# Patient Record
Sex: FEMALE | Race: WHITE | NOT HISPANIC OR LATINO | URBAN - METROPOLITAN AREA
[De-identification: names, ages, dates, MRNs, and addresses within clinical notes are randomized per-mention and may not be internally consistent; named-entity substitution may affect disease eponyms.]

---

## 2017-06-18 ENCOUNTER — EMERGENCY (EMERGENCY)
Facility: HOSPITAL | Age: 57
LOS: 0 days | Discharge: HOME | End: 2017-06-18
Admitting: INTERNAL MEDICINE

## 2017-06-18 DIAGNOSIS — N20.0 CALCULUS OF KIDNEY: ICD-10-CM

## 2017-06-18 DIAGNOSIS — E78.5 HYPERLIPIDEMIA, UNSPECIFIED: ICD-10-CM

## 2017-06-18 DIAGNOSIS — M54.9 DORSALGIA, UNSPECIFIED: ICD-10-CM

## 2017-06-28 DIAGNOSIS — R20.2 PARESTHESIA OF SKIN: ICD-10-CM

## 2017-06-28 DIAGNOSIS — I10 ESSENTIAL (PRIMARY) HYPERTENSION: ICD-10-CM
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
## 2017-06-28 DIAGNOSIS — R00.2 PALPITATIONS: ICD-10-CM

## 2017-06-28 DIAGNOSIS — F10.129 ALCOHOL ABUSE WITH INTOXICATION, UNSPECIFIED: ICD-10-CM

## 2017-06-28 DIAGNOSIS — E78.00 PURE HYPERCHOLESTEROLEMIA, UNSPECIFIED: ICD-10-CM

## 2017-06-28 DIAGNOSIS — Z79.899 OTHER LONG TERM (CURRENT) DRUG THERAPY: ICD-10-CM

## 2018-03-28 ENCOUNTER — OUTPATIENT (OUTPATIENT)
Dept: OUTPATIENT SERVICES | Facility: HOSPITAL | Age: 58
LOS: 1 days | Discharge: HOME | End: 2018-03-28

## 2018-03-28 DIAGNOSIS — Z12.31 ENCOUNTER FOR SCREENING MAMMOGRAM FOR MALIGNANT NEOPLASM OF BREAST: ICD-10-CM

## 2018-10-18 ENCOUNTER — OUTPATIENT (OUTPATIENT)
Dept: OUTPATIENT SERVICES | Facility: HOSPITAL | Age: 58
LOS: 1 days | Discharge: HOME | End: 2018-10-18

## 2018-10-22 DIAGNOSIS — M89.9 DISORDER OF BONE, UNSPECIFIED: ICD-10-CM

## 2018-10-22 DIAGNOSIS — Z78.0 ASYMPTOMATIC MENOPAUSAL STATE: ICD-10-CM

## 2018-10-22 DIAGNOSIS — Z13.820 ENCOUNTER FOR SCREENING FOR OSTEOPOROSIS: ICD-10-CM

## 2019-04-23 ENCOUNTER — OUTPATIENT (OUTPATIENT)
Dept: OUTPATIENT SERVICES | Facility: HOSPITAL | Age: 59
LOS: 1 days | Discharge: HOME | End: 2019-04-23
Payer: MEDICAID

## 2019-04-23 DIAGNOSIS — Z12.31 ENCOUNTER FOR SCREENING MAMMOGRAM FOR MALIGNANT NEOPLASM OF BREAST: ICD-10-CM

## 2019-04-23 PROCEDURE — 77067 SCR MAMMO BI INCL CAD: CPT | Mod: 26

## 2019-04-23 PROCEDURE — 77063 BREAST TOMOSYNTHESIS BI: CPT | Mod: 26

## 2020-08-12 ENCOUNTER — OUTPATIENT (OUTPATIENT)
Dept: OUTPATIENT SERVICES | Facility: HOSPITAL | Age: 60
LOS: 1 days | Discharge: HOME | End: 2020-08-12
Payer: MEDICAID

## 2020-08-12 DIAGNOSIS — Z12.31 ENCOUNTER FOR SCREENING MAMMOGRAM FOR MALIGNANT NEOPLASM OF BREAST: ICD-10-CM

## 2020-08-12 PROCEDURE — 77067 SCR MAMMO BI INCL CAD: CPT | Mod: 26

## 2020-08-12 PROCEDURE — 77063 BREAST TOMOSYNTHESIS BI: CPT | Mod: 26

## 2020-10-05 ENCOUNTER — TRANSCRIPTION ENCOUNTER (OUTPATIENT)
Age: 60
End: 2020-10-05

## 2021-06-14 ENCOUNTER — TRANSCRIPTION ENCOUNTER (OUTPATIENT)
Age: 61
End: 2021-06-14

## 2022-01-25 ENCOUNTER — TRANSCRIPTION ENCOUNTER (OUTPATIENT)
Age: 62
End: 2022-01-25

## 2022-05-13 ENCOUNTER — OUTPATIENT (OUTPATIENT)
Dept: OUTPATIENT SERVICES | Facility: HOSPITAL | Age: 62
LOS: 1 days | Discharge: HOME | End: 2022-05-13
Payer: MEDICAID

## 2022-05-13 DIAGNOSIS — Z12.31 ENCOUNTER FOR SCREENING MAMMOGRAM FOR MALIGNANT NEOPLASM OF BREAST: ICD-10-CM

## 2022-05-13 PROCEDURE — 77067 SCR MAMMO BI INCL CAD: CPT | Mod: 26

## 2022-05-13 PROCEDURE — 77063 BREAST TOMOSYNTHESIS BI: CPT | Mod: 26

## 2022-05-16 DIAGNOSIS — Z78.0 ASYMPTOMATIC MENOPAUSAL STATE: ICD-10-CM

## 2022-05-16 DIAGNOSIS — M89.9 DISORDER OF BONE, UNSPECIFIED: ICD-10-CM

## 2022-05-16 DIAGNOSIS — Z13.820 ENCOUNTER FOR SCREENING FOR OSTEOPOROSIS: ICD-10-CM

## 2023-01-09 ENCOUNTER — APPOINTMENT (OUTPATIENT)
Dept: CARDIOLOGY | Facility: CLINIC | Age: 63
End: 2023-01-09
Payer: MEDICAID

## 2023-01-09 ENCOUNTER — RESULT CHARGE (OUTPATIENT)
Age: 63
End: 2023-01-09

## 2023-01-09 VITALS
BODY MASS INDEX: 33.86 KG/M2 | RESPIRATION RATE: 14 BRPM | TEMPERATURE: 97.9 F | DIASTOLIC BLOOD PRESSURE: 72 MMHG | SYSTOLIC BLOOD PRESSURE: 110 MMHG | WEIGHT: 184 LBS | HEIGHT: 62 IN | HEART RATE: 71 BPM

## 2023-01-09 DIAGNOSIS — Z78.9 OTHER SPECIFIED HEALTH STATUS: ICD-10-CM

## 2023-01-09 DIAGNOSIS — I34.1 NONRHEUMATIC MITRAL (VALVE) PROLAPSE: ICD-10-CM

## 2023-01-09 DIAGNOSIS — Z82.49 FAMILY HISTORY OF ISCHEMIC HEART DISEASE AND OTHER DISEASES OF THE CIRCULATORY SYSTEM: ICD-10-CM

## 2023-01-09 PROCEDURE — 93242 EXT ECG>48HR<7D RECORDING: CPT

## 2023-01-09 PROCEDURE — 99204 OFFICE O/P NEW MOD 45 MIN: CPT | Mod: 25

## 2023-01-09 PROCEDURE — 93000 ELECTROCARDIOGRAM COMPLETE: CPT | Mod: 59

## 2023-01-09 RX ORDER — AMITRIPTYLINE HYDROCHLORIDE 10 MG/1
10 TABLET, FILM COATED ORAL
Refills: 0 | Status: ACTIVE | COMMUNITY

## 2023-01-09 RX ORDER — BLOOD-GLUCOSE METER
W/DEVICE KIT MISCELLANEOUS
Qty: 1 | Refills: 0 | Status: ACTIVE | COMMUNITY
Start: 2022-09-29

## 2023-01-09 RX ORDER — LEVOTHYROXINE SODIUM 50 UG/1
50 TABLET ORAL
Qty: 30 | Refills: 0 | Status: ACTIVE | COMMUNITY
Start: 2022-08-29

## 2023-01-09 RX ORDER — SENNOSIDES 8.6 MG TABLETS 8.6 MG/1
8.6 TABLET ORAL
Qty: 120 | Refills: 0 | Status: ACTIVE | COMMUNITY
Start: 2023-01-03

## 2023-01-09 RX ORDER — ZOLPIDEM TARTRATE 10 MG/1
10 TABLET ORAL
Refills: 0 | Status: ACTIVE | COMMUNITY

## 2023-01-09 RX ORDER — IRBESARTAN AND HYDROCHLOROTHIAZIDE 300; 12.5 MG/1; MG/1
300-12.5 TABLET ORAL DAILY
Refills: 0 | Status: ACTIVE | COMMUNITY

## 2023-01-09 RX ORDER — LANCETS 28 GAUGE
EACH MISCELLANEOUS
Qty: 100 | Refills: 0 | Status: ACTIVE | COMMUNITY
Start: 2022-09-29

## 2023-01-09 RX ORDER — FLUOXETINE HYDROCHLORIDE 20 MG/1
20 TABLET ORAL DAILY
Refills: 0 | Status: ACTIVE | COMMUNITY

## 2023-01-09 RX ORDER — FREMANEZUMAB-VFRM 225 MG/1.5ML
225 INJECTION SUBCUTANEOUS
Qty: 2 | Refills: 0 | Status: ACTIVE | COMMUNITY
Start: 2022-09-07

## 2023-01-09 RX ORDER — BLOOD SUGAR DIAGNOSTIC
STRIP MISCELLANEOUS
Qty: 50 | Refills: 0 | Status: ACTIVE | COMMUNITY
Start: 2022-09-29

## 2023-01-09 RX ORDER — FLUOXETINE HYDROCHLORIDE 10 MG/1
10 TABLET ORAL DAILY
Refills: 0 | Status: ACTIVE | COMMUNITY

## 2023-01-09 RX ORDER — ISOPROPYL ALCOHOL 70 ML/100ML
70 SWAB TOPICAL
Qty: 100 | Refills: 0 | Status: ACTIVE | COMMUNITY
Start: 2022-09-29

## 2023-01-09 RX ORDER — ATORVASTATIN CALCIUM 20 MG/1
20 TABLET, FILM COATED ORAL DAILY
Refills: 0 | Status: ACTIVE | COMMUNITY

## 2023-01-09 RX ORDER — LACTULOSE 10 G/10G
10 POWDER, FOR SOLUTION ORAL
Qty: 60 | Refills: 0 | Status: ACTIVE | COMMUNITY
Start: 2022-12-20

## 2023-01-09 RX ORDER — METOPROLOL SUCCINATE 50 MG/1
50 TABLET, EXTENDED RELEASE ORAL
Qty: 30 | Refills: 0 | Status: ACTIVE | COMMUNITY
Start: 2022-08-29

## 2023-01-09 RX ORDER — PRUCALOPRIDE 2 MG/1
2 TABLET, FILM COATED ORAL
Qty: 30 | Refills: 0 | Status: ACTIVE | COMMUNITY
Start: 2022-10-17

## 2023-01-09 RX ORDER — LUBIPROSTONE 24 UG/1
24 CAPSULE ORAL
Qty: 60 | Refills: 0 | Status: ACTIVE | COMMUNITY
Start: 2022-08-25

## 2023-01-09 NOTE — HISTORY OF PRESENT ILLNESS
[FreeTextEntry1] : 62 y.o. female with PMH of FH of CAD, DM type II, HTN, hyperlipidemia, hypothyroidism, mitral valve prolapse presents to establish care. Patient states that she had a Hx of arrhythmia and has been on Metoprolol for the last 20 years. PCP is Dr. Wilkerson. Had multiple syncope episodes in the past. Presyncope with bearing down. Denies chest pain. Reports frequent daily nervousness, fluttering inside the chest associated with difficulty breathing.

## 2023-01-09 NOTE — REVIEW OF SYSTEMS
[Blurry Vision] : no blurred vision [SOB] : shortness of breath [Chest Discomfort] : no chest discomfort [Lower Ext Edema] : no extremity edema [Leg Claudication] : no intermittent leg claudication [Palpitations] : palpitations [Syncope] : syncope [Anxiety] : no anxiety [Easy Bruising] : no tendency for easy bruising [Negative] : Neurological

## 2023-01-16 ENCOUNTER — EMERGENCY (EMERGENCY)
Facility: HOSPITAL | Age: 63
LOS: 0 days | Discharge: HOME | End: 2023-01-17
Attending: STUDENT IN AN ORGANIZED HEALTH CARE EDUCATION/TRAINING PROGRAM | Admitting: EMERGENCY MEDICINE
Payer: MEDICAID

## 2023-01-16 VITALS
RESPIRATION RATE: 14 BRPM | OXYGEN SATURATION: 98 % | HEART RATE: 81 BPM | TEMPERATURE: 99 F | DIASTOLIC BLOOD PRESSURE: 68 MMHG | SYSTOLIC BLOOD PRESSURE: 158 MMHG

## 2023-01-16 DIAGNOSIS — E11.9 TYPE 2 DIABETES MELLITUS WITHOUT COMPLICATIONS: ICD-10-CM

## 2023-01-16 DIAGNOSIS — I10 ESSENTIAL (PRIMARY) HYPERTENSION: ICD-10-CM

## 2023-01-16 DIAGNOSIS — R06.02 SHORTNESS OF BREATH: ICD-10-CM

## 2023-01-16 DIAGNOSIS — Z20.822 CONTACT WITH AND (SUSPECTED) EXPOSURE TO COVID-19: ICD-10-CM

## 2023-01-16 DIAGNOSIS — E03.9 HYPOTHYROIDISM, UNSPECIFIED: ICD-10-CM

## 2023-01-16 DIAGNOSIS — R07.89 OTHER CHEST PAIN: ICD-10-CM

## 2023-01-16 DIAGNOSIS — R07.9 CHEST PAIN, UNSPECIFIED: ICD-10-CM

## 2023-01-16 DIAGNOSIS — E78.00 PURE HYPERCHOLESTEROLEMIA, UNSPECIFIED: ICD-10-CM

## 2023-01-16 DIAGNOSIS — F32.A DEPRESSION, UNSPECIFIED: ICD-10-CM

## 2023-01-16 DIAGNOSIS — R00.2 PALPITATIONS: ICD-10-CM

## 2023-01-16 DIAGNOSIS — I34.1 NONRHEUMATIC MITRAL (VALVE) PROLAPSE: ICD-10-CM

## 2023-01-16 LAB
ALBUMIN SERPL ELPH-MCNC: 4.9 G/DL — SIGNIFICANT CHANGE UP (ref 3.5–5.2)
ALP SERPL-CCNC: 54 U/L — SIGNIFICANT CHANGE UP (ref 30–115)
ALT FLD-CCNC: 36 U/L — SIGNIFICANT CHANGE UP (ref 0–41)
ANION GAP SERPL CALC-SCNC: 13 MMOL/L — SIGNIFICANT CHANGE UP (ref 7–14)
APTT BLD: 28.4 SEC — SIGNIFICANT CHANGE UP (ref 27–39.2)
AST SERPL-CCNC: 27 U/L — SIGNIFICANT CHANGE UP (ref 0–41)
BASE EXCESS BLDV CALC-SCNC: 1.2 MMOL/L — SIGNIFICANT CHANGE UP (ref -2–3)
BASE EXCESS BLDV CALC-SCNC: 4.6 MMOL/L — HIGH (ref -2–3)
BASOPHILS # BLD AUTO: 0.03 K/UL — SIGNIFICANT CHANGE UP (ref 0–0.2)
BASOPHILS NFR BLD AUTO: 0.4 % — SIGNIFICANT CHANGE UP (ref 0–1)
BILIRUB SERPL-MCNC: 0.6 MG/DL — SIGNIFICANT CHANGE UP (ref 0.2–1.2)
BUN SERPL-MCNC: 11 MG/DL — SIGNIFICANT CHANGE UP (ref 10–20)
CA-I SERPL-SCNC: 1.23 MMOL/L — SIGNIFICANT CHANGE UP (ref 1.15–1.33)
CA-I SERPL-SCNC: 1.25 MMOL/L — SIGNIFICANT CHANGE UP (ref 1.15–1.33)
CALCIUM SERPL-MCNC: 10.4 MG/DL — SIGNIFICANT CHANGE UP (ref 8.4–10.4)
CHLORIDE SERPL-SCNC: 100 MMOL/L — SIGNIFICANT CHANGE UP (ref 98–110)
CO2 SERPL-SCNC: 23 MMOL/L — SIGNIFICANT CHANGE UP (ref 17–32)
CREAT SERPL-MCNC: 0.8 MG/DL — SIGNIFICANT CHANGE UP (ref 0.7–1.5)
D DIMER BLD IA.RAPID-MCNC: <150 NG/ML DDU — SIGNIFICANT CHANGE UP
EGFR: 83 ML/MIN/1.73M2 — SIGNIFICANT CHANGE UP
EOSINOPHIL # BLD AUTO: 0.02 K/UL — SIGNIFICANT CHANGE UP (ref 0–0.7)
EOSINOPHIL NFR BLD AUTO: 0.3 % — SIGNIFICANT CHANGE UP (ref 0–8)
FLUAV AG NPH QL: SIGNIFICANT CHANGE UP
FLUBV AG NPH QL: SIGNIFICANT CHANGE UP
GAS PNL BLDV: 134 MMOL/L — LOW (ref 136–145)
GAS PNL BLDV: 135 MMOL/L — LOW (ref 136–145)
GAS PNL BLDV: SIGNIFICANT CHANGE UP
GAS PNL BLDV: SIGNIFICANT CHANGE UP
GLUCOSE SERPL-MCNC: 170 MG/DL — HIGH (ref 70–99)
HCO3 BLDV-SCNC: 22 MMOL/L — SIGNIFICANT CHANGE UP (ref 22–29)
HCO3 BLDV-SCNC: 26 MMOL/L — SIGNIFICANT CHANGE UP (ref 22–29)
HCT VFR BLD CALC: 43 % — SIGNIFICANT CHANGE UP (ref 37–47)
HCT VFR BLDA CALC: 44 % — SIGNIFICANT CHANGE UP (ref 39–51)
HCT VFR BLDA CALC: 45 % — SIGNIFICANT CHANGE UP (ref 39–51)
HGB BLD CALC-MCNC: 14.6 G/DL — SIGNIFICANT CHANGE UP (ref 12.6–17.4)
HGB BLD CALC-MCNC: 15 G/DL — SIGNIFICANT CHANGE UP (ref 12.6–17.4)
HGB BLD-MCNC: 14.9 G/DL — SIGNIFICANT CHANGE UP (ref 12–16)
IMM GRANULOCYTES NFR BLD AUTO: 0.3 % — SIGNIFICANT CHANGE UP (ref 0.1–0.3)
INR BLD: 0.93 RATIO — SIGNIFICANT CHANGE UP (ref 0.65–1.3)
LACTATE BLDV-MCNC: 3.1 MMOL/L — HIGH (ref 0.5–2)
LACTATE BLDV-MCNC: 3.5 MMOL/L — HIGH (ref 0.5–2)
LIDOCAIN IGE QN: 45 U/L — SIGNIFICANT CHANGE UP (ref 7–60)
LYMPHOCYTES # BLD AUTO: 2.56 K/UL — SIGNIFICANT CHANGE UP (ref 1.2–3.4)
LYMPHOCYTES # BLD AUTO: 36.7 % — SIGNIFICANT CHANGE UP (ref 20.5–51.1)
MAGNESIUM SERPL-MCNC: 1.7 MG/DL — LOW (ref 1.8–2.4)
MCHC RBC-ENTMCNC: 29.7 PG — SIGNIFICANT CHANGE UP (ref 27–31)
MCHC RBC-ENTMCNC: 34.7 G/DL — SIGNIFICANT CHANGE UP (ref 32–37)
MCV RBC AUTO: 85.7 FL — SIGNIFICANT CHANGE UP (ref 81–99)
MONOCYTES # BLD AUTO: 0.55 K/UL — SIGNIFICANT CHANGE UP (ref 0.1–0.6)
MONOCYTES NFR BLD AUTO: 7.9 % — SIGNIFICANT CHANGE UP (ref 1.7–9.3)
NEUTROPHILS # BLD AUTO: 3.8 K/UL — SIGNIFICANT CHANGE UP (ref 1.4–6.5)
NEUTROPHILS NFR BLD AUTO: 54.4 % — SIGNIFICANT CHANGE UP (ref 42.2–75.2)
NRBC # BLD: 0 /100 WBCS — SIGNIFICANT CHANGE UP (ref 0–0)
NT-PROBNP SERPL-SCNC: 43 PG/ML — SIGNIFICANT CHANGE UP (ref 0–300)
PCO2 BLDV: 26 MMHG — LOW (ref 39–42)
PCO2 BLDV: 28 MMHG — LOW (ref 39–42)
PH BLDV: 7.54 — HIGH (ref 7.32–7.43)
PH BLDV: 7.57 — HIGH (ref 7.32–7.43)
PLATELET # BLD AUTO: 311 K/UL — SIGNIFICANT CHANGE UP (ref 130–400)
PO2 BLDV: 30 MMHG — SIGNIFICANT CHANGE UP
PO2 BLDV: 48 MMHG — SIGNIFICANT CHANGE UP
POTASSIUM BLDV-SCNC: 2.8 MMOL/L — CRITICAL LOW (ref 3.5–5.1)
POTASSIUM BLDV-SCNC: 3 MMOL/L — LOW (ref 3.5–5.1)
POTASSIUM SERPL-MCNC: 3.3 MMOL/L — LOW (ref 3.5–5)
POTASSIUM SERPL-SCNC: 3.3 MMOL/L — LOW (ref 3.5–5)
PROT SERPL-MCNC: 6.9 G/DL — SIGNIFICANT CHANGE UP (ref 6–8)
PROTHROM AB SERPL-ACNC: 10.6 SEC — SIGNIFICANT CHANGE UP (ref 9.95–12.87)
RBC # BLD: 5.02 M/UL — SIGNIFICANT CHANGE UP (ref 4.2–5.4)
RBC # FLD: 13.2 % — SIGNIFICANT CHANGE UP (ref 11.5–14.5)
RSV RNA NPH QL NAA+NON-PROBE: SIGNIFICANT CHANGE UP
SAO2 % BLDV: 51.6 % — SIGNIFICANT CHANGE UP
SAO2 % BLDV: 81.2 % — SIGNIFICANT CHANGE UP
SARS-COV-2 RNA SPEC QL NAA+PROBE: SIGNIFICANT CHANGE UP
SODIUM SERPL-SCNC: 136 MMOL/L — SIGNIFICANT CHANGE UP (ref 135–146)
TROPONIN T SERPL-MCNC: <0.01 NG/ML — SIGNIFICANT CHANGE UP
WBC # BLD: 6.98 K/UL — SIGNIFICANT CHANGE UP (ref 4.8–10.8)
WBC # FLD AUTO: 6.98 K/UL — SIGNIFICANT CHANGE UP (ref 4.8–10.8)

## 2023-01-16 PROCEDURE — 93010 ELECTROCARDIOGRAM REPORT: CPT

## 2023-01-16 PROCEDURE — 93010 ELECTROCARDIOGRAM REPORT: CPT | Mod: 77

## 2023-01-16 PROCEDURE — 36000 PLACE NEEDLE IN VEIN: CPT

## 2023-01-16 PROCEDURE — 71045 X-RAY EXAM CHEST 1 VIEW: CPT | Mod: 26

## 2023-01-16 PROCEDURE — 76937 US GUIDE VASCULAR ACCESS: CPT | Mod: 26

## 2023-01-16 PROCEDURE — 99236 HOSP IP/OBS SAME DATE HI 85: CPT | Mod: 25

## 2023-01-16 RX ORDER — MAGNESIUM SULFATE 500 MG/ML
2 VIAL (ML) INJECTION ONCE
Refills: 0 | Status: COMPLETED | OUTPATIENT
Start: 2023-01-16 | End: 2023-01-16

## 2023-01-16 RX ORDER — SODIUM CHLORIDE 9 MG/ML
1000 INJECTION, SOLUTION INTRAVENOUS ONCE
Refills: 0 | Status: COMPLETED | OUTPATIENT
Start: 2023-01-16 | End: 2023-01-16

## 2023-01-16 RX ORDER — DIAZEPAM 5 MG
5 TABLET ORAL ONCE
Refills: 0 | Status: DISCONTINUED | OUTPATIENT
Start: 2023-01-16 | End: 2023-01-16

## 2023-01-16 RX ORDER — POTASSIUM CHLORIDE 20 MEQ
40 PACKET (EA) ORAL ONCE
Refills: 0 | Status: COMPLETED | OUTPATIENT
Start: 2023-01-16 | End: 2023-01-16

## 2023-01-16 RX ADMIN — Medication 40 MILLIEQUIVALENT(S): at 22:41

## 2023-01-16 RX ADMIN — SODIUM CHLORIDE 1000 MILLILITER(S): 9 INJECTION, SOLUTION INTRAVENOUS at 22:31

## 2023-01-16 RX ADMIN — Medication 25 GRAM(S): at 22:42

## 2023-01-16 NOTE — ED PROVIDER NOTE - PHYSICAL EXAMINATION
VITAL SIGNS: I have reviewed nursing notes and confirm.  CONSTITUTIONAL: 63 yo F laying on stretcher; in no acute distress.  SKIN: Skin exam is warm and dry, no acute rash.  HEAD: Normocephalic; atraumatic.  EYES: PERRL, EOM intact; conjunctiva and sclera clear.  ENT: No nasal discharge; airway clear.   CARD: S1, S2 normal; no murmurs, gallops, or rubs. Regular rate and rhythm.  RESP: No wheezes, rales or rhonchi. Speaking in full sentences.   ABD: Normal bowel sounds; soft; non-distended; non-tender; No rebound or guarding. No CVA tenderness.  EXT: Normal ROM. No clubbing, cyanosis or edema. No calf TTP or swelling. DP 2+ B/L and equal.   NEURO: Alert, oriented. Grossly unremarkable. No focal deficits.

## 2023-01-16 NOTE — ED PROCEDURE NOTE - ATTENDING APP SHARED VISIT CONTRIBUTION OF CARE
Patient with difficult IV access I intend to get an US guided IV  I personally supervised the study.  I reviewed the images and interpretation by the resident/ACP and have edited where appropriate.  --Peripheral IV placement

## 2023-01-16 NOTE — ED PROVIDER NOTE - CARE PLAN
1 Principal Discharge DX:	Shortness of breath  Secondary Diagnosis:	Chest pain  Secondary Diagnosis:	Palpitations

## 2023-01-16 NOTE — ED PROVIDER NOTE - ATTENDING APP SHARED VISIT CONTRIBUTION OF CARE
61 yo F with PMHx of HTN, hypothyroidism, mitral valve prolapse, migraine headaches, chronic constipation, DM2, and HLD presents to the ED c/o palpitations (states her HR went up to 125) that started around 430PM and progressed to feeling SOB and mild substernal chest pressure. Pt took 2 extra doses of her metoprolol and called EMS. EMS gave pt 324 mg of ASA and 1 SL nitro with mild improvement in symptoms. Pt admits to associated paresthesias to B/L UE/LE. She denies hx of similar symptoms in the past. Pt also endorses SOB at rest and with exertion over the last month. She saw Dr. Maloney one week ago, had a holter monitor placed but did not receive results. +nausea.  She denies other complaints. Pt denies fever, chills, vomiting, abdominal pain, diarrhea, headache, dizziness, weakness, back pain, LOC, trauma, urinary symptoms, cough, calf pain/swelling, recent travel, recent surgery.  On exam pt hyperventilating, appears anxious, ncat, perrl, eomi. Lungs: ctab, Heart: reg s1s2, Abdomen: soft nt/nd +BS, no mass, Ext: no pedal edema, no calf tenderness, distal pulse intact, Neuro: alert and oriented x 3, motor and sensory intact, no facial droop, no slurred speech, no aphasia    A/P SOB/CP/palpitations, check labs including d-dimer and trop, ekg, xray and reassess    Meds: metoprolol, zolpidem, generlac, synthroid, stealatro, irbesartan, hctz, amitriptyline, fluoxetine, atorvastatin, montegrity,   SH: no smoking  Cardio dr maloney  PMD Nel

## 2023-01-16 NOTE — ED ADULT TRIAGE NOTE - CHIEF COMPLAINT QUOTE
pt bibems from home for chest pain since 5pm, states its getting progressively worse.  ems gave 324 aspirin and 1 sublingual nitro with minimal relief. hx mitral valve prolapse
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
## 2023-01-16 NOTE — ED PROVIDER NOTE - CLINICAL SUMMARY MEDICAL DECISION MAKING FREE TEXT BOX
63 yo F with PMHx of HTN, hypothyroidism, mitral valve prolapse, migraine headaches, chronic constipation, DM2, and HLD presents to the ED c/o palpitations (states her HR went up to 125) that started around 430PM and progressed to feeling SOB and mild substernal chest pressure. Pt took 2 extra doses of her metoprolol and called EMS. EMS gave pt 324 mg of ASA and 1 SL nitro with mild improvement in symptoms. Pt admits to associated paresthesias to B/L UE/LE. She denies hx of similar symptoms in the past. Pt also endorses SOB at rest and with exertion over the last month. She saw Dr. Harris one week ago, had a holter monitor placed but did not receive results. +nausea.  She denies other complaints. Pt denies fever, chills, vomiting, abdominal pain, diarrhea, headache, dizziness, weakness, back pain, LOC, trauma, urinary symptoms, cough, calf pain/swelling, recent travel, recent surgery. Pt hyperventilating and I believe her UE paresthesias likely from this. XR shows no widened mediastinum. First set of labs including d-dimer, troponin and bnp are neg except for slightly low potassium and slightly low magnesium (replaced in ER). Lactate elevated in vbg with low pCO2 (?hyperventilating) which improved after IVFs/benzo, breathing exercises. RN placed pt on NC with oxygen for "pt comfort/reassurance measures" not because she was hypoxic. Placed in OBS for completion of ACS workup.

## 2023-01-16 NOTE — ED PROVIDER NOTE - OBJECTIVE STATEMENT
61 yo F with PMHx of HTN, hypothyroidism, mitral valve prolapse, migraine headaches, chronic constipation, DM2, and HLD presents to the ED c/o palpitations that started around 430PM and progressed to feeling SOB and mild substernal chest pressure. Pt took 2 extra doses of her metoprolol and called EMS. EMS gave pt 324 mg of ASA and 1 SL nitro with mild improvement in symptoms. Pt admits to associated paresthesias to B/L UE/LE. She denies hx of similar symptoms in the past. Pt also endorses SOB at rest and with exertion over the last month. She saw Dr. Harris one week ago, had a holter monitor placed but did not receive results. She denies other complaints. Pt denies fever, chills, nausea, vomiting, abdominal pain, diarrhea, headache, dizziness, weakness, back pain, LOC, trauma, urinary symptoms, cough, calf pain/swelling, recent travel, recent surgery. 61 yo F with PMHx of HTN, hypothyroidism, mitral valve prolapse, migraine headaches, chronic constipation, DM2, and HLD presents to the ED c/o palpitations that started around 430PM and progressed to feeling SOB and mild substernal chest pressure. Pt took 2 extra doses of her metoprolol and called EMS. EMS gave pt 324 mg of ASA and 1 SL nitro with mild improvement in symptoms. Pt admits to associated paresthesias to B/L UE/LE. She denies hx of similar symptoms in the past. Pt also endorses SOB at rest and with exertion over the last month. She saw Dr. Harris one week ago, had a holter monitor placed but did not receive results. +nausea.  She denies other complaints. Pt denies fever, chills, vomiting, abdominal pain, diarrhea, headache, dizziness, weakness, back pain, LOC, trauma, urinary symptoms, cough, calf pain/swelling, recent travel, recent surgery. 61 yo F with PMHx of HTN, hypothyroidism, mitral valve prolapse, migraine headaches, chronic constipation, DM2, depression, and HLD presents to the ED c/o palpitations that started around 430PM and progressed to feeling SOB and mild substernal chest pressure. Pt took 2 extra doses of her metoprolol and called EMS. EMS gave pt 324 mg of ASA and 1 SL nitro with mild improvement in symptoms. Pt admits to associated paresthesias to B/L UE/LE. She denies hx of similar symptoms in the past. Pt also endorses SOB at rest and with exertion over the last month. She saw Dr. Harris one week ago, had a holter monitor placed but did not receive results. +nausea.  She denies other complaints. Pt denies fever, chills, vomiting, abdominal pain, diarrhea, headache, dizziness, weakness, back pain, LOC, trauma, urinary symptoms, cough, calf pain/swelling, recent travel, recent surgery.

## 2023-01-17 VITALS — SYSTOLIC BLOOD PRESSURE: 100 MMHG | DIASTOLIC BLOOD PRESSURE: 53 MMHG | HEART RATE: 61 BPM

## 2023-01-17 LAB
ABO RH CONFIRMATION: SIGNIFICANT CHANGE UP
APPEARANCE UR: CLEAR — SIGNIFICANT CHANGE UP
BACTERIA # UR AUTO: NEGATIVE — SIGNIFICANT CHANGE UP
BASE EXCESS BLDV CALC-SCNC: 0.5 MMOL/L — SIGNIFICANT CHANGE UP (ref -2–3)
BASE EXCESS BLDV CALC-SCNC: 1.3 MMOL/L — SIGNIFICANT CHANGE UP (ref -2–3)
BILIRUB UR-MCNC: NEGATIVE — SIGNIFICANT CHANGE UP
CA-I SERPL-SCNC: 1.19 MMOL/L — SIGNIFICANT CHANGE UP (ref 1.15–1.33)
CA-I SERPL-SCNC: 1.23 MMOL/L — SIGNIFICANT CHANGE UP (ref 1.15–1.33)
COLOR SPEC: SIGNIFICANT CHANGE UP
DIFF PNL FLD: NEGATIVE — SIGNIFICANT CHANGE UP
EPI CELLS # UR: 1 /HPF — SIGNIFICANT CHANGE UP (ref 0–5)
GAS PNL BLDV: 135 MMOL/L — LOW (ref 136–145)
GAS PNL BLDV: 136 MMOL/L — SIGNIFICANT CHANGE UP (ref 136–145)
GAS PNL BLDV: SIGNIFICANT CHANGE UP
GAS PNL BLDV: SIGNIFICANT CHANGE UP
GLUCOSE UR QL: ABNORMAL
HCO3 BLDV-SCNC: 22 MMOL/L — SIGNIFICANT CHANGE UP (ref 22–29)
HCO3 BLDV-SCNC: 24 MMOL/L — SIGNIFICANT CHANGE UP (ref 22–29)
HCT VFR BLDA CALC: 44 % — SIGNIFICANT CHANGE UP (ref 39–51)
HCT VFR BLDA CALC: 51 % — SIGNIFICANT CHANGE UP (ref 39–51)
HGB BLD CALC-MCNC: 14.6 G/DL — SIGNIFICANT CHANGE UP (ref 12.6–17.4)
HGB BLD CALC-MCNC: 17.1 G/DL — SIGNIFICANT CHANGE UP (ref 12.6–17.4)
HYALINE CASTS # UR AUTO: 0 /LPF — SIGNIFICANT CHANGE UP (ref 0–7)
KETONES UR-MCNC: ABNORMAL
LACTATE BLDV-MCNC: 1.3 MMOL/L — SIGNIFICANT CHANGE UP (ref 0.5–2)
LACTATE BLDV-MCNC: 1.9 MMOL/L — SIGNIFICANT CHANGE UP (ref 0.5–2)
LACTATE SERPL-SCNC: 2.6 MMOL/L — HIGH (ref 0.7–2)
LEUKOCYTE ESTERASE UR-ACNC: ABNORMAL
NITRITE UR-MCNC: NEGATIVE — SIGNIFICANT CHANGE UP
PCO2 BLDV: 28 MMHG — LOW (ref 39–42)
PCO2 BLDV: 31 MMHG — LOW (ref 39–42)
PH BLDV: 7.49 — HIGH (ref 7.32–7.43)
PH BLDV: 7.51 — HIGH (ref 7.32–7.43)
PH UR: 7.5 — SIGNIFICANT CHANGE UP (ref 5–8)
PO2 BLDV: 42 MMHG — SIGNIFICANT CHANGE UP
PO2 BLDV: 46 MMHG — SIGNIFICANT CHANGE UP
POTASSIUM BLDV-SCNC: 3.2 MMOL/L — LOW (ref 3.5–5.1)
POTASSIUM BLDV-SCNC: 3.2 MMOL/L — LOW (ref 3.5–5.1)
PROT UR-MCNC: NEGATIVE — SIGNIFICANT CHANGE UP
RBC CASTS # UR COMP ASSIST: 1 /HPF — SIGNIFICANT CHANGE UP (ref 0–4)
SAO2 % BLDV: 74.8 % — SIGNIFICANT CHANGE UP
SAO2 % BLDV: 80.5 % — SIGNIFICANT CHANGE UP
SP GR SPEC: 1.01 — LOW (ref 1.01–1.03)
T3 SERPL-MCNC: 85 NG/DL — SIGNIFICANT CHANGE UP (ref 80–200)
T4 AB SER-ACNC: 8.8 UG/DL — SIGNIFICANT CHANGE UP (ref 4.6–12)
TROPONIN T SERPL-MCNC: <0.01 NG/ML — SIGNIFICANT CHANGE UP
TSH SERPL-MCNC: 3.15 UIU/ML — SIGNIFICANT CHANGE UP (ref 0.27–4.2)
UROBILINOGEN FLD QL: SIGNIFICANT CHANGE UP
WBC UR QL: 6 /HPF — HIGH (ref 0–5)

## 2023-01-17 PROCEDURE — G1004: CPT

## 2023-01-17 PROCEDURE — 75574 CT ANGIO HRT W/3D IMAGE: CPT | Mod: 26,MF

## 2023-01-17 PROCEDURE — 93306 TTE W/DOPPLER COMPLETE: CPT | Mod: 26

## 2023-01-17 RX ORDER — PERPHENAZINE 8 MG/1
2 TABLET, FILM COATED ORAL
Refills: 0 | Status: DISCONTINUED | OUTPATIENT
Start: 2023-01-17 | End: 2023-01-17

## 2023-01-17 RX ORDER — FLUOXETINE HCL 10 MG
20 CAPSULE ORAL DAILY
Refills: 0 | Status: DISCONTINUED | OUTPATIENT
Start: 2023-01-17 | End: 2023-01-17

## 2023-01-17 RX ORDER — ACETAMINOPHEN 500 MG
975 TABLET ORAL ONCE
Refills: 0 | Status: COMPLETED | OUTPATIENT
Start: 2023-01-17 | End: 2023-01-17

## 2023-01-17 RX ORDER — LOSARTAN POTASSIUM 100 MG/1
100 TABLET, FILM COATED ORAL DAILY
Refills: 0 | Status: DISCONTINUED | OUTPATIENT
Start: 2023-01-17 | End: 2023-01-17

## 2023-01-17 RX ORDER — ZOLPIDEM TARTRATE 10 MG/1
5 TABLET ORAL AT BEDTIME
Refills: 0 | Status: DISCONTINUED | OUTPATIENT
Start: 2023-01-17 | End: 2023-01-17

## 2023-01-17 RX ORDER — METOPROLOL TARTRATE 50 MG
50 TABLET ORAL DAILY
Refills: 0 | Status: DISCONTINUED | OUTPATIENT
Start: 2023-01-17 | End: 2023-01-17

## 2023-01-17 RX ORDER — LEVOTHYROXINE SODIUM 125 MCG
50 TABLET ORAL DAILY
Refills: 0 | Status: DISCONTINUED | OUTPATIENT
Start: 2023-01-17 | End: 2023-01-17

## 2023-01-17 RX ORDER — ATORVASTATIN CALCIUM 80 MG/1
20 TABLET, FILM COATED ORAL AT BEDTIME
Refills: 0 | Status: DISCONTINUED | OUTPATIENT
Start: 2023-01-17 | End: 2023-01-17

## 2023-01-17 RX ORDER — AMITRIPTYLINE HCL 25 MG
10 TABLET ORAL
Refills: 0 | Status: DISCONTINUED | OUTPATIENT
Start: 2023-01-17 | End: 2023-01-17

## 2023-01-17 RX ORDER — POTASSIUM CHLORIDE 20 MEQ
20 PACKET (EA) ORAL ONCE
Refills: 0 | Status: COMPLETED | OUTPATIENT
Start: 2023-01-17 | End: 2023-01-17

## 2023-01-17 RX ADMIN — Medication 975 MILLIGRAM(S): at 04:03

## 2023-01-17 RX ADMIN — Medication 50 MICROGRAM(S): at 05:32

## 2023-01-17 RX ADMIN — PERPHENAZINE 2 MILLIGRAM(S): 8 TABLET, FILM COATED ORAL at 05:32

## 2023-01-17 RX ADMIN — Medication 10 MILLIGRAM(S): at 05:36

## 2023-01-17 RX ADMIN — Medication 50 MILLIEQUIVALENT(S): at 02:38

## 2023-01-17 RX ADMIN — Medication 5 MILLIGRAM(S): at 00:29

## 2023-01-17 RX ADMIN — SODIUM CHLORIDE 1000 MILLILITER(S): 9 INJECTION, SOLUTION INTRAVENOUS at 00:16

## 2023-01-17 NOTE — ED CDU PROVIDER DISPOSITION NOTE - CARE PROVIDER_API CALL
follow up with your primary medical doctor,   Phone: (   )    -  Fax: (   )    -  Follow Up Time: 4-6 Days   follow up with your primary medical doctor,   Phone: (   )    -  Fax: (   )    -  Follow Up Time: 4-6 Days    Hector Harris)  Cardiovascular Disease; Nuclear Cardiology  [address] [place] [address]. [address] [place] [address]  Phone: [phone]  Fax: [phone]  Follow Up Time: 4-6 Days

## 2023-01-17 NOTE — ED CDU PROVIDER DISPOSITION NOTE - PROVIDER TOKENS
FREE:[LAST:[follow up with your primary medical doctor],PHONE:[(   )    -],FAX:[(   )    -],FOLLOWUP:[4-6 Days]] FREE:[LAST:[follow up with your primary medical doctor],PHONE:[(   )    -],FAX:[(   )    -],FOLLOWUP:[4-6 Days]],PROVIDER:[TOKEN:[[id number]],FOLLOWUP:[4-6 Days]]

## 2023-01-17 NOTE — ED CDU PROVIDER DISPOSITION NOTE - NSFOLLOWUPINSTRUCTIONS_ED_ALL_ED_FT
Chest Pain    Chest pain can be caused by many different conditions which may or may not be dangerous. Causes include heartburn, lung infections, heart attack, blood clot in lungs, skin infections, strain or damage to muscle, cartilage, or bones, etc. In addition to a history and physical examination, an electrocardiogram (ECG) or other lab tests may have been performed to determine the cause of your chest pain. Follow up with your primary care provider or with a cardiologist as instructed.     SEEK IMMEDIATE MEDICAL CARE IF YOU HAVE ANY OF THE FOLLOWING SYMPTOMS: worsening chest pain, coughing up blood, unexplained back/neck/jaw pain, severe abdominal pain, dizziness or lightheadedness, fainting, shortness of breath, sweaty or clammy skin, vomiting, or racing heart beat. These symptoms may represent a serious problem that is an emergency. Do not wait to see if the symptoms will go away. Get medical help right away. Call 911 and do not drive yourself to the hospital.      Shortness of Breath, Adult      Shortness of breath is when a person has trouble breathing or when a person feels like she or he is having trouble breathing in enough air. Shortness of breath could be a sign of a medical problem.      Follow these instructions at home:  A sign showing that a person should not smoke.   Pollutants     • Do not use any products that contain nicotine or tobacco. These products include cigarettes, chewing tobacco, and vaping devices, such as e-cigarettes. This also includes cigars and pipes. If you need help quitting, ask your health care provider.    •Avoid things that can irritate your airways, including:  •Smoke. This includes campfire smoke, forest fire smoke, and secondhand smoke from tobacco products. Do not smoke or allow others to smoke in your home.      •Mold.      •Dust.      •Air pollution.      •Chemical fumes.      •Things that can give you an allergic reaction (allergens) if you have allergies. Common allergens include pollen from grasses or trees and animal dander.        •Keep your living space clean and free of mold and dust.      General instructions     •Pay attention to any changes in your symptoms.      •Take over-the-counter and prescription medicines only as told by your health care provider. This includes oxygen therapy and inhaled medicines.      •Rest as needed.      •Return to your normal activities as told by your health care provider. Ask your health care provider what activities are safe for you.      •Keep all follow-up visits. This is important.        Contact a health care provider if:    •Your condition does not improve as soon as expected.      •You have a hard time doing your normal activities, even after you rest.      •You have new symptoms.      •You cannot walk up stairs or exercise the way that you normally do.        Get help right away if:    •Your shortness of breath gets worse.      •You have shortness of breath when you are resting.      •You feel light-headed or you faint.      •You have a cough that is not controlled with medicines.      •You cough up blood.      •You have pain with breathing.      •You have pain in your chest, arms, shoulders, or abdomen.      •You have a fever.      These symptoms may be an emergency. Get help right away. Call 911.   • Do not wait to see if the symptoms will go away.       • Do not drive yourself to the hospital.         Summary    •Shortness of breath is when a person has trouble breathing enough air. It can be a sign of a medical problem.      •Avoid things that irritate your lungs, such as smoking, pollution, mold, and dust.      •Pay attention to changes in your symptoms and contact your health care provider if you have a hard time completing daily activities because of shortness of breath.      This information is not intended to replace advice given to you by your health care provider. Make sure you discuss any questions you have with your health care provider. Chest Pain    Chest pain can be caused by many different conditions which may or may not be dangerous. Causes include heartburn, lung infections, heart attack, blood clot in lungs, skin infections, strain or damage to muscle, cartilage, or bones, etc. In addition to a history and physical examination, an electrocardiogram (ECG) or other lab tests may have been performed to determine the cause of your chest pain. Follow up with your primary care provider or with a cardiologist as instructed.     SEEK IMMEDIATE MEDICAL CARE IF YOU HAVE ANY OF THE FOLLOWING SYMPTOMS: worsening chest pain, coughing up blood, unexplained back/neck/jaw pain, severe abdominal pain, dizziness or lightheadedness, fainting, shortness of breath, sweaty or clammy skin, vomiting, or racing heart beat. These symptoms may represent a serious problem that is an emergency. Do not wait to see if the symptoms will go away. Get medical help right away. Call 911 and do not drive yourself to the hospital.      Shortness of Breath, Adult      Shortness of breath is when a person has trouble breathing or when a person feels like she or he is having trouble breathing in enough air. Shortness of breath could be a sign of a medical problem.      Follow these instructions at home:  A sign showing that a person should not smoke.   Pollutants     • Do not use any products that contain nicotine or tobacco. These products include cigarettes, chewing tobacco, and vaping devices, such as e-cigarettes. This also includes cigars and pipes. If you need help quitting, ask your health care provider.    •Avoid things that can irritate your airways, including:  •Smoke. This includes campfire smoke, forest fire smoke, and secondhand smoke from tobacco products. Do not smoke or allow others to smoke in your home.      •Mold.      •Dust.      •Air pollution.      •Chemical fumes.      •Things that can give you an allergic reaction (allergens) if you have allergies. Common allergens include pollen from grasses or trees and animal dander.        •Keep your living space clean and free of mold and dust.      General instructions     •Pay attention to any changes in your symptoms.      •Take over-the-counter and prescription medicines only as told by your health care provider. This includes oxygen therapy and inhaled medicines.      •Rest as needed.      •Return to your normal activities as told by your health care provider. Ask your health care provider what activities are safe for you.      •Keep all follow-up visits. This is important.        Contact a health care provider if:    •Your condition does not improve as soon as expected.      •You have a hard time doing your normal activities, even after you rest.      •You have new symptoms.      •You cannot walk up stairs or exercise the way that you normally do.        Get help right away if:    •Your shortness of breath gets worse.      •You have shortness of breath when you are resting.      •You feel light-headed or you faint.      •You have a cough that is not controlled with medicines.      •You cough up blood.      •You have pain with breathing.      •You have pain in your chest, arms, shoulders, or abdomen.      •You have a fever.      These symptoms may be an emergency. Get help right away. Call 911.   • Do not wait to see if the symptoms will go away.       • Do not drive yourself to the hospital.         Summary    •Shortness of breath is when a person has trouble breathing enough air. It can be a sign of a medical problem.      •Avoid things that irritate your lungs, such as smoking, pollution, mold, and dust.      •Pay attention to changes in your symptoms and contact your health care provider if you have a hard time completing daily activities because of shortness of breath.      This information is not intended to replace advice given to you by your health care provider. Make sure you discuss any questions you have with your health care provider.      Begin taking aspirin 81mg daily.

## 2023-01-17 NOTE — ED CDU PROVIDER DISPOSITION NOTE - ATTENDING CONTRIBUTION TO CARE
I personally evaluated the patient. I reviewed the Resident’s or Physician Assistant’s note (as assigned above), and agree with the findings and plan except as documented in my MDM.

## 2023-01-17 NOTE — ED CDU PROVIDER DISPOSITION NOTE - PATIENT PORTAL LINK FT
You can access the FollowMyHealth Patient Portal offered by [facility] by registering at the following website: [email or web address]. By joining [employer]’s FollowMyHealth portal, you will also be able to view your health information using other applications (apps) compatible with our system.

## 2023-01-17 NOTE — ED CDU PROVIDER INITIAL DAY NOTE - PROGRESS NOTE DETAILS
patient is resting, no new complaints, still feeling mild shortness of breath. awaiting CCTA and echo

## 2023-01-17 NOTE — ED CDU PROVIDER DISPOSITION NOTE - CLINICAL COURSE
61 yo F with PMHx of HTN, hypothyroidism, mitral valve prolapse, migraine headaches, chronic constipation, DM2, depression, and HLD presents to the ED c/o palpitations that started around 430PM and progressed to feeling SOB and mild substernal chest pressure. Pt took 2 extra doses of her metoprolol and called EMS. EMS gave pt 324 mg of ASA and 1 SL nitro with mild improvement in symptoms. Patient placed in Obs for CCTA which showed CAD RADS of 0.  Patient reassessed at bedside and well-appearing.  Discussed conservative management, close follow-up, return precautions. I have fully discussed the medical management and delivery of care with the patient. I have discussed any available labs, imaging and treatment options with the patient. All Questions answered at the bedside and printed copies of all results provided and recommended to review with PCP. Patient confirms understanding and has been given detailed return precautions. Patient instructed to return to the ED should symptoms persist or worsen. Patient has demonstrated capacity and has verbalized understanding. Patient is well appearing upon discharge, ambulatory with a steady gait.

## 2023-01-17 NOTE — ED CDU PROVIDER INITIAL DAY NOTE - OBJECTIVE STATEMENT
61 yo F with PMHx of HTN, hypothyroidism, mitral valve prolapse, migraine headaches, chronic constipation, DM2, depression, and HLD presents to the ED c/o palpitations that started around 430PM and progressed to feeling SOB and mild substernal chest pressure. Pt took 2 extra doses of her metoprolol and called EMS. EMS gave pt 324 mg of ASA and 1 SL nitro with mild improvement in symptoms. Pt admits to associated paresthesias to B/L UE/LE. She denies hx of similar symptoms in the past. Pt also endorses SOB at rest and with exertion over the last month. She saw Dr. Harris one week ago, had a holter monitor placed but did not receive results. +nausea.  She denies other complaints. Pt denies fever, chills, vomiting, abdominal pain, diarrhea, headache, dizziness, weakness, back pain, LOC, trauma, urinary symptoms, cough, calf pain/swelling, recent travel, recent surgery.

## 2023-01-17 NOTE — ED CDU PROVIDER INITIAL DAY NOTE - PHYSICAL EXAMINATION
VITAL SIGNS: I have reviewed nursing notes and confirm.  CONSTITUTIONAL: 61 yo F laying on stretcher; in no acute distress.  SKIN: Skin exam is warm and dry, no acute rash.  HEAD: Normocephalic; atraumatic.  EYES: PERRL, EOM intact; conjunctiva and sclera clear.  ENT: No nasal discharge; airway clear.   CARD: S1, S2 normal; no murmurs, gallops, or rubs. Regular rate and rhythm.  RESP: No wheezes, rales or rhonchi. Speaking in full sentences.   ABD: Normal bowel sounds; soft; non-distended; non-tender; No rebound or guarding. No CVA tenderness.  EXT: Normal ROM. No clubbing, cyanosis or edema. No calf TTP or swelling. DP 2+ B/L and equal.   NEURO: Alert, oriented. Grossly unremarkable. No focal deficits.

## 2023-01-17 NOTE — ED CDU PROVIDER INITIAL DAY NOTE - CLINICAL SUMMARY MEDICAL DECISION MAKING FREE TEXT BOX
63 yo F with PMHx of HTN, hypothyroidism, mitral valve prolapse, migraine headaches, chronic constipation, DM2, depression, and HLD presents to the ED c/o palpitations that started around 430PM and progressed to feeling SOB and mild substernal chest pressure. Pt took 2 extra doses of her metoprolol and called EMS. EMS gave pt 324 mg of ASA and 1 SL nitro with mild improvement in symptoms. Pt admits to associated paresthesias to B/L 63 yo F with PMHx of HTN, hypothyroidism, mitral valve prolapse, migraine headaches, chronic constipation, DM2, depression, and HLD presents to the ED c/o palpitations that started around 430PM and progressed to feeling SOB and mild substernal chest pressure. Pt took 2 extra doses of her metoprolol and called EMS. EMS gave pt 324 mg of ASA and 1 SL nitro with mild improvement in symptoms. Patient placed in OBS for CCTA.

## 2023-01-17 NOTE — ED CDU PROVIDER DISPOSITION NOTE - CARE PROVIDERS DIRECT ADDRESSES
,DirectAddress_Unknown ,DirectAddress_Unknown,[email or web address]

## 2023-01-18 LAB
CULTURE RESULTS: SIGNIFICANT CHANGE UP
SPECIMEN SOURCE: SIGNIFICANT CHANGE UP

## 2023-01-22 LAB
CULTURE RESULTS: SIGNIFICANT CHANGE UP
CULTURE RESULTS: SIGNIFICANT CHANGE UP
SPECIMEN SOURCE: SIGNIFICANT CHANGE UP
SPECIMEN SOURCE: SIGNIFICANT CHANGE UP

## 2023-01-25 NOTE — ED PROVIDER NOTE - WR INTERPRETED BY 1
No change in medications.  Furosemide was taken off your medication list.  You do not need to use that at this time.  If you were to have increasing leg swelling or shortness of breath, that may be considered in the future.    Continue your regular exercise, which is very important to keep your back strong.    Continue with your CPAP machine every night.    Your regular exercises and use of CPAP machine will help reduce the progression of your shortness of breath.  Follow-up as needed if you do have significant worsening shortness of breath.    Proceed with heart echo as scheduled.    You may consider a colonoscopy this September, is a 3-year follow-up from your previous colonoscopy.    See me in approximately 6 months for routine follow-up appointment, we can discuss that then.    See ophthalmology next month as planned.    See dermatology in May for history of skin cancer follow-up.    I would recommend the new COVID-vaccine booster, which can be obtained at local pharmacy.    You can proceed with acupuncture for your back pain if you wish.  
[facility]

## 2023-02-02 ENCOUNTER — APPOINTMENT (OUTPATIENT)
Dept: CARDIOLOGY | Facility: CLINIC | Age: 63
End: 2023-02-02

## 2023-02-07 NOTE — ED PROCEDURE NOTE - US POC STATEMENT
Shift assessment complete; see flow sheet. Scheduled medications administered; See MAR. IV flushed without difficulty. Pt denies pain at this time. Glucose 402, lantus and humalog given. Notified MD about pt diet and glucose at this time. No new orders. Will reassess glucose at 0200. Pt denies any needs at this time.  Pt educated on use of call light and to call out with needs, verbalized understanding, bed in low locked position for pt safety The patient/family was/were informed of limited nature of the exam. Representative images were printed to be scanned into the chart or directly uploaded into the medical record.

## 2023-04-04 ENCOUNTER — APPOINTMENT (OUTPATIENT)
Dept: CARDIOLOGY | Facility: CLINIC | Age: 63
End: 2023-04-04
Payer: MEDICAID

## 2023-04-04 VITALS
BODY MASS INDEX: 33.86 KG/M2 | HEIGHT: 62 IN | WEIGHT: 184 LBS | DIASTOLIC BLOOD PRESSURE: 72 MMHG | HEART RATE: 79 BPM | SYSTOLIC BLOOD PRESSURE: 110 MMHG | RESPIRATION RATE: 14 BRPM

## 2023-04-04 DIAGNOSIS — R55 SYNCOPE AND COLLAPSE: ICD-10-CM

## 2023-04-04 DIAGNOSIS — R94.31 ABNORMAL ELECTROCARDIOGRAM [ECG] [EKG]: ICD-10-CM

## 2023-04-04 DIAGNOSIS — R06.02 SHORTNESS OF BREATH: ICD-10-CM

## 2023-04-04 DIAGNOSIS — R00.2 PALPITATIONS: ICD-10-CM

## 2023-04-04 PROCEDURE — 93000 ELECTROCARDIOGRAM COMPLETE: CPT

## 2023-04-04 PROCEDURE — 99214 OFFICE O/P EST MOD 30 MIN: CPT | Mod: 25

## 2023-04-04 NOTE — HISTORY OF PRESENT ILLNESS
[FreeTextEntry1] : 63 y.o. female with PMH of FH of CAD, DM type II, HTN, hyperlipidemia, hypothyroidism, mitral valve prolapse, Had multiple syncope episodes in the past.\par \par Patient presents for a follow up visit. She went to the ER in January for numbness and tingling in b/l arms and legs.\par CCTA:\par Normal coronary arteries\par CAD RADS 0.\par \par 2D ECHO:\par LVEF of 63 %.\par Mild mitral annular calcification.\par Trace mitral valve regurgitation.\par Mild thickening of the anterior and posterior mitral valve leaflets.\par There is mild aortic root calcification.\par \par She now denies chest pain but still having SOB at rest throughout the day and frequent daily nervousness, fluttering inside the chest associated with difficulty breathing.

## 2023-04-04 NOTE — ASSESSMENT
[FreeTextEntry1] : 62 y..o female with multiple CAD risk factors: DM, HTN, hyperlipidemia, FH of CAD.\par Normal coronary arteries, normal LVEF, no mitral valve disease.\par Syncope/presyncope episodes in the past.\par Anxiety poorly controlled, probably causes most of patient's symptoms. \par \par Plan:\par Continue treatment.\par No additional cardiac evaluation at this time.\par Psychiatry f/u.\par Patient will f/u with PCP.\par \par Hector Harris MD\par \par

## 2023-04-04 NOTE — REVIEW OF SYSTEMS
[SOB] : shortness of breath [Palpitations] : palpitations [Syncope] : syncope [Negative] : Neurological [Blurry Vision] : no blurred vision [Chest Discomfort] : no chest discomfort [Lower Ext Edema] : no extremity edema [Leg Claudication] : no intermittent leg claudication [Anxiety] : no anxiety [Easy Bruising] : no tendency for easy bruising

## 2023-04-05 ENCOUNTER — RESULT CHARGE (OUTPATIENT)
Age: 63
End: 2023-04-05

## 2023-05-16 ENCOUNTER — OUTPATIENT (OUTPATIENT)
Dept: OUTPATIENT SERVICES | Facility: HOSPITAL | Age: 63
LOS: 1 days | End: 2023-05-16
Payer: MEDICAID

## 2023-05-16 DIAGNOSIS — Z12.31 ENCOUNTER FOR SCREENING MAMMOGRAM FOR MALIGNANT NEOPLASM OF BREAST: ICD-10-CM

## 2023-05-16 PROCEDURE — 77067 SCR MAMMO BI INCL CAD: CPT

## 2023-05-16 PROCEDURE — 77067 SCR MAMMO BI INCL CAD: CPT | Mod: 26

## 2023-05-16 PROCEDURE — 77063 BREAST TOMOSYNTHESIS BI: CPT

## 2023-05-16 PROCEDURE — 77063 BREAST TOMOSYNTHESIS BI: CPT | Mod: 26

## 2023-05-17 DIAGNOSIS — Z12.31 ENCOUNTER FOR SCREENING MAMMOGRAM FOR MALIGNANT NEOPLASM OF BREAST: ICD-10-CM

## 2023-08-30 ENCOUNTER — APPOINTMENT (OUTPATIENT)
Dept: ENDOCRINOLOGY | Facility: CLINIC | Age: 63
End: 2023-08-30

## 2023-08-30 ENCOUNTER — OUTPATIENT (OUTPATIENT)
Dept: OUTPATIENT SERVICES | Facility: HOSPITAL | Age: 63
LOS: 1 days | End: 2023-08-30
Payer: MEDICAID

## 2023-08-30 VITALS
HEART RATE: 71 BPM | SYSTOLIC BLOOD PRESSURE: 104 MMHG | BODY MASS INDEX: 34.23 KG/M2 | DIASTOLIC BLOOD PRESSURE: 60 MMHG | HEIGHT: 62 IN | WEIGHT: 186 LBS

## 2023-08-30 DIAGNOSIS — E03.9 HYPOTHYROIDISM, UNSPECIFIED: ICD-10-CM

## 2023-08-30 DIAGNOSIS — Z00.00 ENCOUNTER FOR GENERAL ADULT MEDICAL EXAMINATION W/OUT ABNORMAL FINDINGS: ICD-10-CM

## 2023-08-30 DIAGNOSIS — Z00.00 ENCOUNTER FOR GENERAL ADULT MEDICAL EXAMINATION WITHOUT ABNORMAL FINDINGS: ICD-10-CM

## 2023-08-30 PROCEDURE — 99204 OFFICE O/P NEW MOD 45 MIN: CPT

## 2023-08-30 RX ORDER — METFORMIN HYDROCHLORIDE 500 MG/1
500 TABLET, COATED ORAL
Refills: 0 | Status: ACTIVE | COMMUNITY
Start: 2023-08-30

## 2023-08-30 RX ORDER — ERTUGLIFLOZIN 5 MG/1
5 TABLET, FILM COATED ORAL
Qty: 30 | Refills: 0 | Status: DISCONTINUED | COMMUNITY
Start: 2022-08-24 | End: 2023-08-30

## 2023-08-30 NOTE — ASSESSMENT
[FreeTextEntry1] : Pt is a 64 y/o F w/PMHx pre-DM, HTN, hyperlipidemia, hypothyroidism, mitral valve prolapse presenting for endocrine evaluation. Pt reports she has been on Synthroid for 5-6 years. Lab work from 06/2023 showing elevated TPO.  #Hypothyroidism - On Synthroid 50mcg QD, TSH, T4, T3, all WNL 06/2023 - C/w current Synthroid dose  #Pre-DM #Obesity - A1C 6.0 06/2023 - BMI 34, up from 33 in 04/2023  - Start Mounjaro 2.5mg weekly for 1 month - Carb consistent diet, exercise  #HLD - C/w Lipitor

## 2023-08-30 NOTE — REVIEW OF SYSTEMS
[Fatigue] : fatigue [Constipation] : constipation [Dry Skin] : dry skin [Neck Pain] : no neck pain [Palpitations] : no palpitations [Shortness Of Breath] : no shortness of breath [Nausea] : no nausea [Vomiting] : no vomiting [Polyuria] : no polyuria [Joint Pain] : no joint pain

## 2023-08-30 NOTE — HISTORY OF PRESENT ILLNESS
[FreeTextEntry1] : Pt is a 64 y/o F w/PMHx pre-DM, HTN, hyperlipidemia, hypothyroidism, mitral valve prolapse presenting for endocrine evaluation. Pt reports she has been on Synthroid for 5-6 years. Lab work from 06/2023 showing elevated TPO.

## 2023-08-30 NOTE — PHYSICAL EXAM
[Alert] : alert [Well Nourished] : well nourished [Supple] : the neck was supple [No Respiratory Distress] : no respiratory distress [No Accessory Muscle Use] : no accessory muscle use [Clear to Auscultation] : lungs were clear to auscultation bilaterally [Normal Bowel Sounds] : normal bowel sounds [Soft] : abdomen soft

## 2023-08-31 DIAGNOSIS — E03.9 HYPOTHYROIDISM, UNSPECIFIED: ICD-10-CM

## 2023-08-31 DIAGNOSIS — E11.65 TYPE 2 DIABETES MELLITUS WITH HYPERGLYCEMIA: ICD-10-CM

## 2023-09-28 ENCOUNTER — EMERGENCY (EMERGENCY)
Facility: HOSPITAL | Age: 63
LOS: 0 days | Discharge: HOME | End: 2023-09-28
Attending: EMERGENCY MEDICINE
Payer: MEDICAID

## 2023-09-28 VITALS
DIASTOLIC BLOOD PRESSURE: 56 MMHG | SYSTOLIC BLOOD PRESSURE: 110 MMHG | RESPIRATION RATE: 16 BRPM | TEMPERATURE: 99 F | OXYGEN SATURATION: 98 % | HEART RATE: 62 BPM

## 2023-09-28 DIAGNOSIS — R11.0 NAUSEA: ICD-10-CM

## 2023-09-28 DIAGNOSIS — E78.5 HYPERLIPIDEMIA, UNSPECIFIED: ICD-10-CM

## 2023-09-28 DIAGNOSIS — S09.90XA UNSPECIFIED INJURY OF HEAD, INITIAL ENCOUNTER: ICD-10-CM

## 2023-09-28 DIAGNOSIS — E11.9 TYPE 2 DIABETES MELLITUS WITHOUT COMPLICATIONS: ICD-10-CM
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
## 2023-09-28 PROCEDURE — 72125 CT NECK SPINE W/O DYE: CPT | Mod: 26,MA

## 2023-09-28 PROCEDURE — 99284 EMERGENCY DEPT VISIT MOD MDM: CPT | Mod: 25

## 2023-09-28 PROCEDURE — 72125 CT NECK SPINE W/O DYE: CPT | Mod: MA

## 2023-09-28 PROCEDURE — 99284 EMERGENCY DEPT VISIT MOD MDM: CPT

## 2023-09-28 PROCEDURE — 70450 CT HEAD/BRAIN W/O DYE: CPT | Mod: 26,MA

## 2023-09-28 PROCEDURE — 70486 CT MAXILLOFACIAL W/O DYE: CPT | Mod: MA

## 2023-09-28 PROCEDURE — 70450 CT HEAD/BRAIN W/O DYE: CPT | Mod: MA

## 2023-09-28 PROCEDURE — 70486 CT MAXILLOFACIAL W/O DYE: CPT | Mod: 26,MA

## 2023-09-28 RX ORDER — ACETAMINOPHEN 500 MG
975 TABLET ORAL ONCE
Refills: 0 | Status: COMPLETED | OUTPATIENT
Start: 2023-09-28 | End: 2023-09-28

## 2023-09-28 RX ORDER — ONDANSETRON 8 MG/1
4 TABLET, FILM COATED ORAL ONCE
Refills: 0 | Status: COMPLETED | OUTPATIENT
Start: 2023-09-28 | End: 2023-09-28

## 2023-09-28 RX ADMIN — ONDANSETRON 4 MILLIGRAM(S): 8 TABLET, FILM COATED ORAL at 15:32

## 2023-09-28 RX ADMIN — Medication 975 MILLIGRAM(S): at 15:37

## 2023-09-28 NOTE — ED ADULT NURSE NOTE - NSFALLASSESSNEED_ED_ALL_ED
[address] [place] [address] Department of Endocrinology Diabetes and Metabolism   [address] [facility] [address]   Phone: [phone]  Fax: [phone]    Date of Service: 4/22/20    Primary Care Physician: Jud Samaniego MD  Provider: Paula Capellan MD     Reason for the visit:  DM type 1 on insulin Pump     History of Present Illness: The history is provided by the patient. No  was used. Accuracy of the patient data is excellent. Arlena Closs is a very pleasant 50 y.o. male seen today for follow up visit      Arlena Closs was diagnosed with diabetes at age 29 and currently Lantus 25 units daily, Humalog 1u:10g + ss 1:50> 150     Pt was on 530g Medtronic insulin in the past with the following --> Basal rate 12a 1.3, 7a 1.5, CR 10, ISF 35, goal 120-140, active insulin time 4 hrs     Pt will be due for pump upgrading madeline few months. He will meet with our pump  mutiple times before restarting insulin pump     The patient has been checks BS few times a day. BS still highly variable   Most recent A1c results summarized below  Lab Results   Component Value Date    LABA1C 10.7 12/08/2019    LABA1C 9.1 07/26/2019    LABA1C 9.0 04/12/2019     Patient still experiencing hypoglycemic episodes   The patient has been mindful of what has been eating and tries to follow low carb diet   I reviewed current medications and the patient has no issues with diabetes medications  Arlena Closs is over due for an eye exam. Last eye exam was few years ago.  The patient performs his own feet care and doesn't see podiatry   His diabetes is complicated with neuropathy and retinopathy s/p laser therapy   Macrovascular complications: no CAD, PVD, + TIA 2/2019     PAST MEDICAL HISTORY   Past Medical History:   Diagnosis Date    Alcoholism ([address]     Cocaine abuse ([address]     Diabetes mellitus ([facility]     Hypertension     Idiopathic peripheral neuropathy 9/21/2016    Marijuana abuse
no

## 2023-09-28 NOTE — ED PROVIDER NOTE - OBJECTIVE STATEMENT
Patient is a 63-year-old female past medical history of hypertension, hyperlipidemia, diabetes presenting for evaluation status post fall.  Patient states that she was at a grocery store when she had a mechanical slip on a grape causing her to fall forward hitting the left side of her head on a shelf.  She denies any LOC, vision ranges, ear ringing, neck pain, dizziness, headache.  Patient not on any anticoagulation.  Patient was on aspirin 1 month ago.  She is complaining of mild nausea.

## 2023-09-28 NOTE — ED ADULT NURSE NOTE - OBJECTIVE STATEMENT
Pt presents to the ED s/p fall after trying to prevent a shopping cart from going into a car. Pt c/o shoulder pain.

## 2023-09-28 NOTE — ED PROVIDER NOTE - ATTENDING CONTRIBUTION TO CARE
62 yo f with pmh of htn, hld, dm, presents with c/o L face pain s/p fall.  pt was at a grocery store and slipped forward on a grape.  pt says she hit the L side of her face on the fruit shelf.  pt denies loc.  no neck pain.  no dental injury/no eye injury.  pt admits used to take daily asa, but stopped 1 month.  feels mild nausea, no vomiting.  exam: nad, L medial infraorb mild swelling, no significant ecchymosis, perrl, eomi, mmm, rrr, ctab, abd soft, nt, nd aox3, imp: pt with facial trauma, will check ct

## 2023-09-28 NOTE — ED ADULT NURSE NOTE - NS PRO PASSIVE SMOKE EXP
Sending in Trulicity which will be 1 injection weekly (1 pen weekly) because it should have better coverage. It is in the same drug class as the Ozempic, so stop the Ozempic now. Please remind her to get labs completed as ordered prior to her next visit.    Unknown

## 2023-09-28 NOTE — ED PROVIDER NOTE - CLINICAL SUMMARY MEDICAL DECISION MAKING FREE TEXT BOX
Pt with slip and head trauma, neg imaging, pt to f/u with pmd outpt.  Pt was given strict return to ED precautions and pt indicated that he/she understood.

## 2023-09-28 NOTE — ED PROVIDER NOTE - PATIENT PORTAL LINK FT
[facility] TREATMENT PLAN      Location: [x] [place] [] [place]    Treatment plan: Initial    Strengths: Hard worker, caring    Weakness/Limitations: impulsive     Service/Frequency/Duration: Individual 1 time a week for 90 days, Group assigned 1 time a week for 90 days, Urinalysis Random for 90 days, and Case Management as needed for 90 days    Diagnosis: F12.10 Nondependent cannabis abuse-unspecified use    Level of Care: 1 Outpatient Services     Problem: Substance use resulting in getting a marijuana STEPHANIE      Goal: Enhance personalized knowledge and insight associated with mood altering substances in 90 days   Objectives:   1) Remind self of 3-5 detrimental consequences in major life areas regarding substance use in 90 days Evaluation Date: 12/14/2022 Code: Achieved 9/21/2022 Client reports marijuana affected work due to making him lazy, he is on probation, he spent money, and his parents were disappointed in him due to getting the fitaborate. 2) Identify 4 to 8 benefits and gratitude's due to remaining substance free in 90 days: Evaluation Date: 12/14/2022 Code: Achieved 12/7/2022 I am grateful for my daughter being healthy, my fiance' and my relationship, and for our home. 3) Identify 4 relapse triggers, define relapse, difference between internal and external triggers associated with substance use in 90 days and Evaluation Date: 12/14/2022 Code: Achieved 10/19/2022 Client reports when he first stopped smoking marijuana people and frustration were his biggest triggers. Now he denies having any triggers.       2.    Problem: Low Self-Esteem/Identify issues as a result of self-medicating     Goal: Will learn to focus on character strengths and feel better about himself in 90 days  Objectives:   1) Client will journal 3-5 times weekly thoughts and feelings and share in his individual sessions in 90 days:  Evaluation Date: 12/14/2022 Code: Achieved 12/21/2022 Client reports he has been feeling better now that he is not sick. He has not been working very much at all. 2) Client will explore 3 new outdoor/indoor activities that bring him quentin in 90 days Evaluation Date:1/14/2023 Code: Continue 12/7/2022 Client has been working outside and the weather has been nice. 3)  Utilize, if needed case management services provided by [facility] to enhance abstaining from substance use in 90 days Evaluation Date: 12/14/2022 Code: Discontinue 11/16/2022        3. Problem: History of Relapse due to lack of sober support. Goal: Understand that continuing to associate with the current peer group increases the risk of relapse in 90 days         Objectives:   1)  Identify and address 4 to 8 peers he should set boundaries with to avoid substance use in 90 days Evaluation Date: 12/14/2022 Code: Achieved 11/9/2022 Client has set clear boundaries with his family about not smoking around his child/baby. He reports his friends don't come around anymore     2) Enhance 4 to 8 healthy techniques and coping skills, relapse prevention in 90 days Evaluation Date: 12/14/2022 Code: Achieved 11/30/2022 Client reports he works full time, plays video games, plays with his dogs and plays basketball. 3) Identify 5 times when peer group negativity led to addictive behavior in 90 days Evaluation Date: 12/14/2022 Code Achieved 11/9/2022 Client denies peer group influenced him at all. If he wanted to smoke weed he would. 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 Discharge Plan/Instructions: Client will remain abstinent from all mood altering substances and complete his treatment plan goals and objectives. Jerry Shah / 2003 has participated in the treatment plan development outlined above on 12/21/2022.      Meggan Burks, [address]  12/21/2022/2:15 PM You can access the FollowMyHealth Patient Portal offered by [facility] by registering at the following website: [email or web address]. By joining [facility]’s FollowMyHealth portal, you will also be able to view your health information using other applications (apps) compatible with our system.

## 2023-09-28 NOTE — ED PROVIDER NOTE - PHYSICAL EXAMINATION
CONSTITUTIONAL: well developed, well nourished, in no acute distress, speaking in full sentences, nontoxic appearing  SKIN: warm, dry, no rash  HEAD: normocephalic, atraumatic  EYES: PERRL, EOMI, no conjunctival erythema, no nystagmus  ENT: patent airway, moist mucous membranes, no tongue deviation  NECK: supple, no masses, full flexion/extension without pain  CV:  regular rate, regular rhythm, 2+ radial pulses bilaterally  RESP: no wheezes, no rales, no rhonchi, normal work of breathing  ABD: soft, nontender, nondistended, no rebound, no guarding  MSK: normal ROM, no cyanosis, no edema  NEURO: alert, oriented, CN 2-12 grossly intact, sensation intact to light touch symmetrically, 5/5 motor strength in all extremities,  no pronator drift, no facial asymmetry, normal gait  PSYCH: cooperative, appropriate

## 2024-01-19 NOTE — ED PROVIDER NOTE - INTERPRETATION
Plan of care reviewed with patient and mother; both in agreement. Pain medication and 1 unit platelet administered. Appts printed and given to patient.  
normal sinus rhythm

## 2024-04-03 RX ORDER — TIRZEPATIDE 5 MG/.5ML
5 INJECTION, SOLUTION SUBCUTANEOUS
Qty: 1 | Refills: 5 | Status: ACTIVE | COMMUNITY
Start: 2023-08-30 | End: 1900-01-01

## 2024-04-10 ENCOUNTER — OUTPATIENT (OUTPATIENT)
Dept: OUTPATIENT SERVICES | Facility: HOSPITAL | Age: 64
LOS: 1 days | End: 2024-04-10
Payer: MEDICAID

## 2024-04-10 ENCOUNTER — APPOINTMENT (OUTPATIENT)
Dept: ENDOCRINOLOGY | Facility: CLINIC | Age: 64
End: 2024-04-10

## 2024-04-10 VITALS
BODY MASS INDEX: 33.13 KG/M2 | SYSTOLIC BLOOD PRESSURE: 105 MMHG | WEIGHT: 180 LBS | DIASTOLIC BLOOD PRESSURE: 65 MMHG | HEART RATE: 67 BPM | HEIGHT: 62 IN

## 2024-04-10 DIAGNOSIS — E11.9 TYPE 2 DIABETES MELLITUS W/OUT COMPLICATIONS: ICD-10-CM

## 2024-04-10 DIAGNOSIS — Z00.00 ENCOUNTER FOR GENERAL ADULT MEDICAL EXAMINATION WITHOUT ABNORMAL FINDINGS: ICD-10-CM

## 2024-04-10 DIAGNOSIS — E66.9 OBESITY, UNSPECIFIED: ICD-10-CM

## 2024-04-10 DIAGNOSIS — R73.03 PREDIABETES.: ICD-10-CM

## 2024-04-10 DIAGNOSIS — E03.9 HYPOTHYROIDISM, UNSPECIFIED: ICD-10-CM

## 2024-04-10 PROCEDURE — 99214 OFFICE O/P EST MOD 30 MIN: CPT

## 2024-05-23 ENCOUNTER — APPOINTMENT (OUTPATIENT)
Dept: ENDOCRINOLOGY | Facility: CLINIC | Age: 64
End: 2024-05-23

## 2024-07-04 ENCOUNTER — NON-APPOINTMENT (OUTPATIENT)
Age: 64
End: 2024-07-04

## 2024-08-09 ENCOUNTER — OUTPATIENT (OUTPATIENT)
Dept: OUTPATIENT SERVICES | Facility: HOSPITAL | Age: 64
LOS: 1 days | End: 2024-08-09
Payer: MEDICAID

## 2024-08-09 DIAGNOSIS — Z12.31 ENCOUNTER FOR SCREENING MAMMOGRAM FOR MALIGNANT NEOPLASM OF BREAST: ICD-10-CM

## 2024-08-09 PROCEDURE — 77067 SCR MAMMO BI INCL CAD: CPT

## 2024-08-09 PROCEDURE — 77063 BREAST TOMOSYNTHESIS BI: CPT | Mod: 26

## 2024-08-09 PROCEDURE — 77063 BREAST TOMOSYNTHESIS BI: CPT

## 2024-08-09 PROCEDURE — 77067 SCR MAMMO BI INCL CAD: CPT | Mod: 26

## 2024-08-10 DIAGNOSIS — Z12.31 ENCOUNTER FOR SCREENING MAMMOGRAM FOR MALIGNANT NEOPLASM OF BREAST: ICD-10-CM

## 2025-04-09 ENCOUNTER — APPOINTMENT (OUTPATIENT)
Dept: ENDOCRINOLOGY | Facility: CLINIC | Age: 65
End: 2025-04-09

## 2025-04-09 ENCOUNTER — OUTPATIENT (OUTPATIENT)
Dept: OUTPATIENT SERVICES | Facility: HOSPITAL | Age: 65
LOS: 1 days | End: 2025-04-09
Payer: MEDICARE

## 2025-04-09 VITALS
SYSTOLIC BLOOD PRESSURE: 112 MMHG | WEIGHT: 175 LBS | DIASTOLIC BLOOD PRESSURE: 79 MMHG | BODY MASS INDEX: 29.16 KG/M2 | HEART RATE: 77 BPM | HEIGHT: 65 IN

## 2025-04-09 DIAGNOSIS — R73.03 PREDIABETES.: ICD-10-CM

## 2025-04-09 DIAGNOSIS — E03.9 HYPOTHYROIDISM, UNSPECIFIED: ICD-10-CM

## 2025-04-09 DIAGNOSIS — Z00.00 ENCOUNTER FOR GENERAL ADULT MEDICAL EXAMINATION WITHOUT ABNORMAL FINDINGS: ICD-10-CM

## 2025-04-09 DIAGNOSIS — Z78.9 OTHER SPECIFIED HEALTH STATUS: ICD-10-CM

## 2025-04-09 DIAGNOSIS — E66.9 OBESITY, UNSPECIFIED: ICD-10-CM

## 2025-04-09 PROCEDURE — 99204 OFFICE O/P NEW MOD 45 MIN: CPT

## 2025-04-09 RX ORDER — TIRZEPATIDE 2.5 MG/.5ML
2.5 INJECTION, SOLUTION SUBCUTANEOUS
Qty: 1 | Refills: 0 | Status: ACTIVE | COMMUNITY
Start: 2025-04-09 | End: 1900-01-01

## 2025-04-24 DIAGNOSIS — R73.03 PREDIABETES: ICD-10-CM

## 2025-04-24 DIAGNOSIS — E66.9 OBESITY, UNSPECIFIED: ICD-10-CM

## 2025-04-24 DIAGNOSIS — E03.9 HYPOTHYROIDISM, UNSPECIFIED: ICD-10-CM

## 2025-05-01 ENCOUNTER — APPOINTMENT (OUTPATIENT)
Dept: ORTHOPEDIC SURGERY | Facility: CLINIC | Age: 65
End: 2025-05-01

## 2025-05-01 DIAGNOSIS — M25.512 PAIN IN LEFT SHOULDER: ICD-10-CM

## 2025-05-01 PROCEDURE — 99204 OFFICE O/P NEW MOD 45 MIN: CPT | Mod: 25

## 2025-05-01 PROCEDURE — 20611 DRAIN/INJ JOINT/BURSA W/US: CPT | Mod: LT

## 2025-05-23 ENCOUNTER — INPATIENT (INPATIENT)
Facility: HOSPITAL | Age: 65
LOS: 1 days | Discharge: HOME | DRG: 694 | End: 2025-05-25
Attending: HOSPITALIST | Admitting: INTERNAL MEDICINE
Payer: MEDICARE

## 2025-05-23 VITALS
RESPIRATION RATE: 17 BRPM | SYSTOLIC BLOOD PRESSURE: 164 MMHG | DIASTOLIC BLOOD PRESSURE: 83 MMHG | HEART RATE: 77 BPM | OXYGEN SATURATION: 99 % | WEIGHT: 184.97 LBS | TEMPERATURE: 98 F | HEIGHT: 62 IN

## 2025-05-23 DIAGNOSIS — N13.2 HYDRONEPHROSIS WITH RENAL AND URETERAL CALCULOUS OBSTRUCTION: ICD-10-CM

## 2025-05-23 LAB
ALBUMIN SERPL ELPH-MCNC: 4.3 G/DL — SIGNIFICANT CHANGE UP (ref 3.5–5.2)
ALP SERPL-CCNC: 51 U/L — SIGNIFICANT CHANGE UP (ref 30–115)
ALT FLD-CCNC: 31 U/L — SIGNIFICANT CHANGE UP (ref 0–41)
ANION GAP SERPL CALC-SCNC: 12 MMOL/L — SIGNIFICANT CHANGE UP (ref 7–14)
APPEARANCE UR: ABNORMAL
APTT BLD: 29.1 SEC — SIGNIFICANT CHANGE UP (ref 27–39.2)
AST SERPL-CCNC: 22 U/L — SIGNIFICANT CHANGE UP (ref 0–41)
BACTERIA # UR AUTO: NEGATIVE /HPF — SIGNIFICANT CHANGE UP
BASOPHILS # BLD AUTO: 0.02 K/UL — SIGNIFICANT CHANGE UP (ref 0–0.2)
BASOPHILS NFR BLD AUTO: 0.4 % — SIGNIFICANT CHANGE UP (ref 0–1)
BILIRUB SERPL-MCNC: 0.6 MG/DL — SIGNIFICANT CHANGE UP (ref 0.2–1.2)
BILIRUB UR-MCNC: ABNORMAL
BUN SERPL-MCNC: 14 MG/DL — SIGNIFICANT CHANGE UP (ref 10–20)
CALCIUM SERPL-MCNC: 9.5 MG/DL — SIGNIFICANT CHANGE UP (ref 8.4–10.5)
CAST: 3 /LPF — SIGNIFICANT CHANGE UP (ref 0–4)
CHLORIDE SERPL-SCNC: 105 MMOL/L — SIGNIFICANT CHANGE UP (ref 98–110)
CO2 SERPL-SCNC: 24 MMOL/L — SIGNIFICANT CHANGE UP (ref 17–32)
COLOR SPEC: ABNORMAL
CREAT SERPL-MCNC: 0.8 MG/DL — SIGNIFICANT CHANGE UP (ref 0.7–1.5)
DIFF PNL FLD: ABNORMAL
EGFR: 82 ML/MIN/1.73M2 — SIGNIFICANT CHANGE UP
EGFR: 82 ML/MIN/1.73M2 — SIGNIFICANT CHANGE UP
EOSINOPHIL # BLD AUTO: 0.07 K/UL — SIGNIFICANT CHANGE UP (ref 0–0.7)
EOSINOPHIL NFR BLD AUTO: 1.2 % — SIGNIFICANT CHANGE UP (ref 0–8)
GLUCOSE SERPL-MCNC: 112 MG/DL — HIGH (ref 70–99)
GLUCOSE UR QL: NEGATIVE MG/DL — SIGNIFICANT CHANGE UP
HCT VFR BLD CALC: 39.2 % — SIGNIFICANT CHANGE UP (ref 37–47)
HGB BLD-MCNC: 13.3 G/DL — SIGNIFICANT CHANGE UP (ref 12–16)
IMM GRANULOCYTES NFR BLD AUTO: 0.2 % — SIGNIFICANT CHANGE UP (ref 0.1–0.3)
INR BLD: 0.9 RATIO — SIGNIFICANT CHANGE UP (ref 0.65–1.3)
KETONES UR QL: NEGATIVE MG/DL — SIGNIFICANT CHANGE UP
LACTATE SERPL-SCNC: 1.6 MMOL/L — SIGNIFICANT CHANGE UP (ref 0.7–2)
LEUKOCYTE ESTERASE UR-ACNC: ABNORMAL
LIDOCAIN IGE QN: 49 U/L — SIGNIFICANT CHANGE UP (ref 7–60)
LYMPHOCYTES # BLD AUTO: 2.03 K/UL — SIGNIFICANT CHANGE UP (ref 1.2–3.4)
LYMPHOCYTES # BLD AUTO: 36.2 % — SIGNIFICANT CHANGE UP (ref 20.5–51.1)
MCHC RBC-ENTMCNC: 30.2 PG — SIGNIFICANT CHANGE UP (ref 27–31)
MCHC RBC-ENTMCNC: 33.9 G/DL — SIGNIFICANT CHANGE UP (ref 32–37)
MCV RBC AUTO: 88.9 FL — SIGNIFICANT CHANGE UP (ref 81–99)
MONOCYTES # BLD AUTO: 0.6 K/UL — SIGNIFICANT CHANGE UP (ref 0.1–0.6)
MONOCYTES NFR BLD AUTO: 10.7 % — HIGH (ref 1.7–9.3)
NEUTROPHILS # BLD AUTO: 2.88 K/UL — SIGNIFICANT CHANGE UP (ref 1.4–6.5)
NEUTROPHILS NFR BLD AUTO: 51.3 % — SIGNIFICANT CHANGE UP (ref 42.2–75.2)
NITRITE UR-MCNC: POSITIVE
NRBC BLD AUTO-RTO: 0 /100 WBCS — SIGNIFICANT CHANGE UP (ref 0–0)
PH UR: 5 — SIGNIFICANT CHANGE UP (ref 5–8)
PLATELET # BLD AUTO: 303 K/UL — SIGNIFICANT CHANGE UP (ref 130–400)
PMV BLD: 8.5 FL — SIGNIFICANT CHANGE UP (ref 7.4–10.4)
POTASSIUM SERPL-MCNC: 3.8 MMOL/L — SIGNIFICANT CHANGE UP (ref 3.5–5)
POTASSIUM SERPL-SCNC: 3.8 MMOL/L — SIGNIFICANT CHANGE UP (ref 3.5–5)
PROT SERPL-MCNC: 6.2 G/DL — SIGNIFICANT CHANGE UP (ref 6–8)
PROT UR-MCNC: 100 MG/DL
PROTHROM AB SERPL-ACNC: 10.6 SEC — SIGNIFICANT CHANGE UP (ref 9.95–12.87)
RBC # BLD: 4.41 M/UL — SIGNIFICANT CHANGE UP (ref 4.2–5.4)
RBC # FLD: 12.8 % — SIGNIFICANT CHANGE UP (ref 11.5–14.5)
RBC CASTS # UR COMP ASSIST: >1900 /HPF — HIGH (ref 0–4)
SODIUM SERPL-SCNC: 141 MMOL/L — SIGNIFICANT CHANGE UP (ref 135–146)
SP GR SPEC: 1.02 — SIGNIFICANT CHANGE UP (ref 1–1.03)
SQUAMOUS # UR AUTO: 3 /HPF — SIGNIFICANT CHANGE UP (ref 0–5)
URATE SERPL-MCNC: 5.1 MG/DL — SIGNIFICANT CHANGE UP (ref 2.5–7)
UROBILINOGEN FLD QL: 0.2 MG/DL — SIGNIFICANT CHANGE UP (ref 0.2–1)
WBC # BLD: 5.61 K/UL — SIGNIFICANT CHANGE UP (ref 4.8–10.8)
WBC # FLD AUTO: 5.61 K/UL — SIGNIFICANT CHANGE UP (ref 4.8–10.8)
WBC UR QL: 18 /HPF — HIGH (ref 0–5)

## 2025-05-23 PROCEDURE — 99285 EMERGENCY DEPT VISIT HI MDM: CPT

## 2025-05-23 PROCEDURE — 80053 COMPREHEN METABOLIC PANEL: CPT

## 2025-05-23 PROCEDURE — 86901 BLOOD TYPING SEROLOGIC RH(D): CPT

## 2025-05-23 PROCEDURE — 85025 COMPLETE CBC W/AUTO DIFF WBC: CPT

## 2025-05-23 PROCEDURE — T1013: CPT

## 2025-05-23 PROCEDURE — 84550 ASSAY OF BLOOD/URIC ACID: CPT

## 2025-05-23 PROCEDURE — 99232 SBSQ HOSP IP/OBS MODERATE 35: CPT

## 2025-05-23 PROCEDURE — 83036 HEMOGLOBIN GLYCOSYLATED A1C: CPT

## 2025-05-23 PROCEDURE — 85730 THROMBOPLASTIN TIME PARTIAL: CPT

## 2025-05-23 PROCEDURE — 82962 GLUCOSE BLOOD TEST: CPT

## 2025-05-23 PROCEDURE — 74176 CT ABD & PELVIS W/O CONTRAST: CPT | Mod: 26

## 2025-05-23 PROCEDURE — 36415 COLL VENOUS BLD VENIPUNCTURE: CPT

## 2025-05-23 PROCEDURE — 85027 COMPLETE CBC AUTOMATED: CPT

## 2025-05-23 PROCEDURE — 85610 PROTHROMBIN TIME: CPT

## 2025-05-23 PROCEDURE — 86850 RBC ANTIBODY SCREEN: CPT

## 2025-05-23 PROCEDURE — 83735 ASSAY OF MAGNESIUM: CPT

## 2025-05-23 PROCEDURE — 86900 BLOOD TYPING SEROLOGIC ABO: CPT

## 2025-05-23 PROCEDURE — 84100 ASSAY OF PHOSPHORUS: CPT

## 2025-05-23 PROCEDURE — 80048 BASIC METABOLIC PNL TOTAL CA: CPT

## 2025-05-23 PROCEDURE — 99222 1ST HOSP IP/OBS MODERATE 55: CPT

## 2025-05-23 RX ORDER — METOPROLOL SUCCINATE 50 MG/1
1 TABLET, EXTENDED RELEASE ORAL
Refills: 0 | DISCHARGE

## 2025-05-23 RX ORDER — DEXTROSE 50 % IN WATER 50 %
25 SYRINGE (ML) INTRAVENOUS ONCE
Refills: 0 | Status: DISCONTINUED | OUTPATIENT
Start: 2025-05-23 | End: 2025-05-25

## 2025-05-23 RX ORDER — DEXTROSE 50 % IN WATER 50 %
12.5 SYRINGE (ML) INTRAVENOUS ONCE
Refills: 0 | Status: DISCONTINUED | OUTPATIENT
Start: 2025-05-23 | End: 2025-05-25

## 2025-05-23 RX ORDER — TAMSULOSIN HYDROCHLORIDE 0.4 MG/1
0.4 CAPSULE ORAL ONCE
Refills: 0 | Status: COMPLETED | OUTPATIENT
Start: 2025-05-23 | End: 2025-05-23

## 2025-05-23 RX ORDER — PERPHENAZINE 2 MG/1
1 TABLET, FILM COATED ORAL
Refills: 0 | DISCHARGE

## 2025-05-23 RX ORDER — LEVOTHYROXINE SODIUM 300 MCG
1 TABLET ORAL
Refills: 0 | DISCHARGE

## 2025-05-23 RX ORDER — ONDANSETRON HCL/PF 4 MG/2 ML
4 VIAL (ML) INJECTION EVERY 8 HOURS
Refills: 0 | Status: DISCONTINUED | OUTPATIENT
Start: 2025-05-23 | End: 2025-05-25

## 2025-05-23 RX ORDER — PERPHENAZINE 2 MG/1
2 TABLET, FILM COATED ORAL AT BEDTIME
Refills: 0 | Status: DISCONTINUED | OUTPATIENT
Start: 2025-05-23 | End: 2025-05-25

## 2025-05-23 RX ORDER — MAGNESIUM, ALUMINUM HYDROXIDE 200-200 MG
30 TABLET,CHEWABLE ORAL EVERY 4 HOURS
Refills: 0 | Status: DISCONTINUED | OUTPATIENT
Start: 2025-05-23 | End: 2025-05-25

## 2025-05-23 RX ORDER — ATORVASTATIN CALCIUM 80 MG/1
1 TABLET, FILM COATED ORAL
Refills: 0 | DISCHARGE

## 2025-05-23 RX ORDER — SUMATRIPTAN 100 MG/1
50 TABLET, FILM COATED ORAL DAILY
Refills: 0 | Status: DISCONTINUED | OUTPATIENT
Start: 2025-05-23 | End: 2025-05-25

## 2025-05-23 RX ORDER — AMITRIPTYLINE HYDROCHLORIDE 25 MG/1
10 TABLET, FILM COATED ORAL DAILY
Refills: 0 | Status: DISCONTINUED | OUTPATIENT
Start: 2025-05-23 | End: 2025-05-25

## 2025-05-23 RX ORDER — FLUOXETINE HYDROCHLORIDE 20 MG/1
20 CAPSULE ORAL DAILY
Refills: 0 | Status: DISCONTINUED | OUTPATIENT
Start: 2025-05-23 | End: 2025-05-25

## 2025-05-23 RX ORDER — TAMSULOSIN HYDROCHLORIDE 0.4 MG/1
0.4 CAPSULE ORAL AT BEDTIME
Refills: 0 | Status: DISCONTINUED | OUTPATIENT
Start: 2025-05-23 | End: 2025-05-25

## 2025-05-23 RX ORDER — DEXTROSE 50 % IN WATER 50 %
15 SYRINGE (ML) INTRAVENOUS ONCE
Refills: 0 | Status: DISCONTINUED | OUTPATIENT
Start: 2025-05-23 | End: 2025-05-25

## 2025-05-23 RX ORDER — KETOROLAC TROMETHAMINE 30 MG/ML
15 INJECTION, SOLUTION INTRAMUSCULAR; INTRAVENOUS ONCE
Refills: 0 | Status: DISCONTINUED | OUTPATIENT
Start: 2025-05-23 | End: 2025-05-23

## 2025-05-23 RX ORDER — ACETAMINOPHEN 500 MG/5ML
650 LIQUID (ML) ORAL EVERY 6 HOURS
Refills: 0 | Status: DISCONTINUED | OUTPATIENT
Start: 2025-05-23 | End: 2025-05-25

## 2025-05-23 RX ORDER — ASPIRIN 325 MG
81 TABLET ORAL DAILY
Refills: 0 | Status: DISCONTINUED | OUTPATIENT
Start: 2025-05-23 | End: 2025-05-25

## 2025-05-23 RX ORDER — CEFTRIAXONE 500 MG/1
2000 INJECTION, POWDER, FOR SOLUTION INTRAMUSCULAR; INTRAVENOUS ONCE
Refills: 0 | Status: COMPLETED | OUTPATIENT
Start: 2025-05-23 | End: 2025-05-23

## 2025-05-23 RX ORDER — IRBESARTAN 75 MG/1
1 TABLET ORAL
Refills: 0 | DISCHARGE

## 2025-05-23 RX ORDER — POLYETHYLENE GLYCOL 3350 17 G/17G
17 POWDER, FOR SOLUTION ORAL EVERY 12 HOURS
Refills: 0 | Status: DISCONTINUED | OUTPATIENT
Start: 2025-05-23 | End: 2025-05-25

## 2025-05-23 RX ORDER — SODIUM CHLORIDE 9 G/1000ML
1000 INJECTION, SOLUTION INTRAVENOUS
Refills: 0 | Status: DISCONTINUED | OUTPATIENT
Start: 2025-05-23 | End: 2025-05-25

## 2025-05-23 RX ORDER — AMITRIPTYLINE HYDROCHLORIDE 25 MG/1
1 TABLET, FILM COATED ORAL
Refills: 0 | DISCHARGE

## 2025-05-23 RX ORDER — KETOROLAC TROMETHAMINE 30 MG/ML
15 INJECTION, SOLUTION INTRAMUSCULAR; INTRAVENOUS EVERY 8 HOURS
Refills: 0 | Status: DISCONTINUED | OUTPATIENT
Start: 2025-05-23 | End: 2025-05-25

## 2025-05-23 RX ORDER — BISACODYL 5 MG
1 TABLET, DELAYED RELEASE (ENTERIC COATED) ORAL
Refills: 0 | DISCHARGE

## 2025-05-23 RX ORDER — ATORVASTATIN CALCIUM 80 MG/1
20 TABLET, FILM COATED ORAL AT BEDTIME
Refills: 0 | Status: DISCONTINUED | OUTPATIENT
Start: 2025-05-23 | End: 2025-05-25

## 2025-05-23 RX ORDER — FREMANEZUMAB-VFRM 225 MG/1.5ML
225 INJECTION SUBCUTANEOUS
Refills: 0 | DISCHARGE

## 2025-05-23 RX ORDER — BISACODYL 5 MG
5 TABLET, DELAYED RELEASE (ENTERIC COATED) ORAL AT BEDTIME
Refills: 0 | Status: DISCONTINUED | OUTPATIENT
Start: 2025-05-23 | End: 2025-05-25

## 2025-05-23 RX ORDER — TIRZEPATIDE 7.5 MG/.5ML
5 INJECTION, SOLUTION SUBCUTANEOUS
Refills: 0 | DISCHARGE

## 2025-05-23 RX ORDER — METOPROLOL SUCCINATE 50 MG/1
50 TABLET, EXTENDED RELEASE ORAL DAILY
Refills: 0 | Status: DISCONTINUED | OUTPATIENT
Start: 2025-05-23 | End: 2025-05-25

## 2025-05-23 RX ORDER — AMLODIPINE BESYLATE 10 MG/1
1 TABLET ORAL
Refills: 0 | DISCHARGE

## 2025-05-23 RX ORDER — LEVOTHYROXINE SODIUM 300 MCG
50 TABLET ORAL DAILY
Refills: 0 | Status: DISCONTINUED | OUTPATIENT
Start: 2025-05-23 | End: 2025-05-25

## 2025-05-23 RX ORDER — AMLODIPINE BESYLATE 10 MG/1
5 TABLET ORAL DAILY
Refills: 0 | Status: DISCONTINUED | OUTPATIENT
Start: 2025-05-23 | End: 2025-05-25

## 2025-05-23 RX ORDER — GLUCAGON 3 MG/1
1 POWDER NASAL ONCE
Refills: 0 | Status: DISCONTINUED | OUTPATIENT
Start: 2025-05-23 | End: 2025-05-25

## 2025-05-23 RX ORDER — INSULIN LISPRO 100 U/ML
INJECTION, SOLUTION INTRAVENOUS; SUBCUTANEOUS
Refills: 0 | Status: DISCONTINUED | OUTPATIENT
Start: 2025-05-23 | End: 2025-05-25

## 2025-05-23 RX ORDER — SENNA 187 MG
2 TABLET ORAL AT BEDTIME
Refills: 0 | Status: DISCONTINUED | OUTPATIENT
Start: 2025-05-23 | End: 2025-05-25

## 2025-05-23 RX ORDER — UBROGEPANT 50 MG/1
1 TABLET ORAL
Refills: 0 | DISCHARGE

## 2025-05-23 RX ORDER — FLUOXETINE HYDROCHLORIDE 20 MG/1
1 CAPSULE ORAL
Refills: 0 | DISCHARGE

## 2025-05-23 RX ORDER — MELATONIN 5 MG
3 TABLET ORAL AT BEDTIME
Refills: 0 | Status: DISCONTINUED | OUTPATIENT
Start: 2025-05-23 | End: 2025-05-25

## 2025-05-23 RX ORDER — LINACLOTIDE 290 UG/1
1 CAPSULE, GELATIN COATED ORAL
Refills: 0 | DISCHARGE

## 2025-05-23 RX ORDER — LOSARTAN POTASSIUM 100 MG/1
50 TABLET, FILM COATED ORAL DAILY
Refills: 0 | Status: DISCONTINUED | OUTPATIENT
Start: 2025-05-23 | End: 2025-05-25

## 2025-05-23 RX ORDER — CEFTRIAXONE 500 MG/1
1000 INJECTION, POWDER, FOR SOLUTION INTRAMUSCULAR; INTRAVENOUS EVERY 24 HOURS
Refills: 0 | Status: DISCONTINUED | OUTPATIENT
Start: 2025-05-24 | End: 2025-05-25

## 2025-05-23 RX ORDER — ASPIRIN 325 MG
1 TABLET ORAL
Refills: 0 | DISCHARGE

## 2025-05-23 RX ADMIN — AMITRIPTYLINE HYDROCHLORIDE 10 MILLIGRAM(S): 25 TABLET, FILM COATED ORAL at 12:04

## 2025-05-23 RX ADMIN — TAMSULOSIN HYDROCHLORIDE 0.4 MILLIGRAM(S): 0.4 CAPSULE ORAL at 21:55

## 2025-05-23 RX ADMIN — Medication 5 MILLIGRAM(S): at 21:54

## 2025-05-23 RX ADMIN — PERPHENAZINE 2 MILLIGRAM(S): 2 TABLET, FILM COATED ORAL at 21:54

## 2025-05-23 RX ADMIN — SODIUM CHLORIDE 100 MILLILITER(S): 9 INJECTION, SOLUTION INTRAVENOUS at 08:43

## 2025-05-23 RX ADMIN — KETOROLAC TROMETHAMINE 15 MILLIGRAM(S): 30 INJECTION, SOLUTION INTRAMUSCULAR; INTRAVENOUS at 05:05

## 2025-05-23 RX ADMIN — ATORVASTATIN CALCIUM 20 MILLIGRAM(S): 80 TABLET, FILM COATED ORAL at 21:53

## 2025-05-23 RX ADMIN — Medication 1000 MILLILITER(S): at 05:05

## 2025-05-23 RX ADMIN — Medication 1000 MILLILITER(S): at 06:09

## 2025-05-23 RX ADMIN — SODIUM CHLORIDE 100 MILLILITER(S): 9 INJECTION, SOLUTION INTRAVENOUS at 19:01

## 2025-05-23 RX ADMIN — Medication 2 TABLET(S): at 21:54

## 2025-05-23 RX ADMIN — CEFTRIAXONE 100 MILLIGRAM(S): 500 INJECTION, POWDER, FOR SOLUTION INTRAMUSCULAR; INTRAVENOUS at 05:37

## 2025-05-23 RX ADMIN — FLUOXETINE HYDROCHLORIDE 20 MILLIGRAM(S): 20 CAPSULE ORAL at 12:04

## 2025-05-23 RX ADMIN — Medication 81 MILLIGRAM(S): at 12:05

## 2025-05-23 RX ADMIN — Medication 2 MILLIGRAM(S): at 20:19

## 2025-05-23 RX ADMIN — TAMSULOSIN HYDROCHLORIDE 0.4 MILLIGRAM(S): 0.4 CAPSULE ORAL at 06:46

## 2025-05-23 NOTE — H&P ADULT - NSHPREVIEWOFSYSTEMS_GEN_ALL_CORE
REVIEW OF SYSTEMS:  CONSTITUTIONAL: No weakness, fevers or chills  EYES/ENT: No visual changes;  No vertigo or throat pain   NECK: No pain or stiffness  RESPIRATORY: No cough, wheezing, hemoptysis; No shortness of breath  CARDIOVASCULAR: No chest pain or palpitations  GASTROINTESTINAL: No abdominal or epigastric pain. No nausea, vomiting, or hematemesis; No diarrhea or constipation. No melena or hematochezia.  GENITOURINARY: Reports hematuria and left flank pain. Denies any dysuria or increase in frequency.   NEUROLOGICAL: No numbness or weakness  SKIN: No itching, rashes

## 2025-05-23 NOTE — ED PROVIDER NOTE - CLINICAL SUMMARY MEDICAL DECISION MAKING FREE TEXT BOX
Abdomen benign and nontender, with low suspicion for acute intra-abdominal process to warrant CT with IV contrast. Character, exam, appearance low suspicion for mesenteric ischemia or dissection to warrant CT angio imaging. Character low suspicion for ACS or PE. Character could be consistent with kidney stone. CT shows 6 mm left mid ureteral calculus with mild to moderate hydro. UA consistent with UTI. Seen by urology and Abdomen benign and nontender, with low suspicion for acute intra-abdominal process to warrant CT with IV contrast. Character, exam, appearance low suspicion for mesenteric ischemia or dissection to warrant CT angio imaging. Character low suspicion for ACS or PE. Character could be consistent with kidney stone. CT shows 6 mm left mid ureteral calculus with mild to moderate hydro. UA consistent with UTI. Seen by urology due to concern for septic stone, and will observe for now unless develops fever/other concerns, request medical admission on IV abx at this time. Given IVF and Toradol with significant improvement. Patient well appearing, hemodynamically stable. Admitted to internal medicine for further monitoring, w/u, and care.

## 2025-05-23 NOTE — H&P ADULT - NSHPLABSRESULTS_GEN_ALL_CORE
.  LABS:                         13.3   5.61  )-----------( 303      ( 23 May 2025 05:10 )             39.2     05-23    141  |  105  |  14  ----------------------------<  112[H]  3.8   |  24  |  0.8    Ca    9.5      23 May 2025 05:10    TPro  6.2  /  Alb  4.3  /  TBili  0.6  /  DBili  x   /  AST  22  /  ALT  31  /  AlkPhos  51  05-23      Urinalysis Basic - ( 23 May 2025 05:10 )    Color: x / Appearance: x / SG: x / pH: x  Gluc: 112 mg/dL / Ketone: x  / Bili: x / Urobili: x   Blood: x / Protein: x / Nitrite: x   Leuk Esterase: x / RBC: x / WBC x   Sq Epi: x / Non Sq Epi: x / Bacteria: x        Lactate, Blood: 1.6 mmol/L (05-23 @ 05:10)      RADIOLOGY, EKG & ADDITIONAL TESTS: Reviewed.

## 2025-05-23 NOTE — H&P ADULT - NSHPPHYSICALEXAM_GEN_ALL_CORE
PHYSICAL EXAM:  GENERAL: NAD, well-developed  HEAD:  Atraumatic, Normocephalic  EYES: EOMI, PERRLA, conjunctiva and sclera clear  NECK: Supple, No JVD  CHEST/LUNG: Clear to auscultation bilaterally; No wheeze  HEART: Regular rate and rhythm; No murmurs, rubs, or gallops  ABDOMEN: Soft, Nontender, Nondistended; Bowel sounds present. Mild left sided CVA tenderness   EXTREMITIES:  2+ Peripheral Pulses, No clubbing, cyanosis, or edema  PSYCH: AAOx3  NEUROLOGY: non-focal  SKIN: No rashes or lesions

## 2025-05-23 NOTE — ED PROVIDER NOTE - PHYSICAL EXAMINATION
Afebrile, hemodynamically stable, saturating well on room air  NAD, uncomfortable but nontoxic appearing, laying on side in bed, no WOB, speaking full sentences  Head NCAT  EOMI grossly, anicteric  MMM  RRR  Breathing comfortably on room air  Abd soft, entirely NT, ND, nml BS, no rebound or guarding, no CVAT  AAO, CN's 3-12 grossly intact  OCONNOR spontaneously, no leg cyanosis or edema  Skin warm, well perfused, no rashes or hives including to back

## 2025-05-23 NOTE — CONSULT NOTE ADULT - NS ATTEND AMEND GEN_ALL_CORE FT
64 yo female with chronic conditions of obesity, DM, HLD, history of nephrolithiasis presented with left abdominal pain and found to have left hydroureteronephrosis secondary to a 4x3x6 mm left ureteral calculus.     Pain is improved today.   She has been afebrile without an elevated WBC or CLAUS.   Recommend tamsulosin 0.4 mg nightly and high fluid hydration daily for trial of passage.   Pain control.   Continue antibiotic. Follow up UCx.   Will continue to follow.

## 2025-05-23 NOTE — CONSULT NOTE ADULT - ASSESSMENT
Mild Left Hydronephrosis secondary to [id number] mid ureteral calculus  Nitrite + urine  Afebrile Vital signs stable  Ceftriaxone 2gm IVPB given

## 2025-05-23 NOTE — CONSULT NOTE ADULT - SUBJECTIVE AND OBJECTIVE BOX
Patient is a 65y old  Female who presents with a chief complaint of Left sided abdominal pain which began around midnight. Pt states pain was very sharp and intermittent, radiating to the left groin region. Pt has a hx of kidney stones, treated by Dr Cosme 5yrs ago. No stones since then. Pt denies fever or dysuria        PAST MEDICAL & SURGICAL HISTORY:  DM  HLD  Kidney Stones  Back surgery    REVIEW OF SYSTEMS:    CONSTITUTIONAL:  fevers or chills  HEENT: No visual changes  ENDO: No sweating  NECK: No pain or stiffness  MUSCULOSKELETAL: No back pain, no joint pain  RESPIRATORY: No shortness of breath  CARDIOVASCULAR: No chest pain  GASTROINTESTINAL: No abdominal or epigastric pain. No nausea, vomiting,  No diarrhea or constipation.   NEUROLOGICAL: No mental status changes  PSYCH: No depression, no mood changes  SKIN: No itching      MEDICATIONS  (STANDING):  tamsulosin 0.4 milliGRAM(s) Oral once    MEDICATIONS  (PRN):      Allergies    No Known Allergies    Intolerances        SOCIAL HISTORY: No illicit drug use    FAMILY HISTORY:      Vital Signs Last 24 Hrs  T(C): 36.4 (23 May 2025 06:18), Max: 36.4 (23 May 2025 03:51)  T(F): 97.5 (23 May 2025 06:18), Max: 97.5 (23 May 2025 03:51)  HR: 71 (23 May 2025 06:24) (67 - 77)  BP: 132/65 (23 May 2025 06:24) (130/87 - 164/83)  BP(mean): --  RR: 18 (23 May 2025 06:18) (17 - 18)  SpO2: 98% (23 May 2025 06:18) (98% - 99%)    Parameters below as of 23 May 2025 06:18  Patient On (Oxygen Delivery Method): room air        PHYSICAL EXAM:    Constitutional: NAD, well-developed  HEENT: EOMI  Neck: no pain  Back: Mild Left sided CVA tenderness  Respiratory: No accessory respiratory muscle use  Abd: Soft, NT/ND  no organomegally  no hernia  : No suprapubic fullness or tenderness  Extremities: no edema  Neurological: A/O x 3  Psychiatric: Normal mood, normal affect  Skin: No rashes    I&O's Summary      LABS:                        13.3   5.61  )-----------( 303      ( 23 May 2025 05:10 )             39.2     05-23    141  |  105  |  14  ----------------------------<  112[H]  3.8   |  24  |  0.8    Ca    9.5      23 May 2025 05:10    TPro  6.2  /  Alb  4.3  /  TBili  0.6  /  DBili  x   /  AST  22  /  ALT  31  /  AlkPhos  51  05-23      Urinalysis Basic - ( 23 May 2025 05:10 )    Color: x / Appearance: x / SG: x / pH: x  Gluc: 112 mg/dL / Ketone: x  / Bili: x / Urobili: x   Blood: x / Protein: x / Nitrite: x   Leuk Esterase: x / RBC: x / WBC x   Sq Epi: x / Non Sq Epi: x / Bacteria: x      Urine Culture: Pending        RADIOLOGY & ADDITIONAL STUDIES:  < from: CT Abdomen and Pelvis No Cont (05.23.25 @ 04:48) >    ACC: [id number] EXAM:  CT ABDOMEN AND PELVIS   ORDERED BY: SID FRAIRE     PROCEDURE DATE:  05/23/2025          INTERPRETATION:  CLINICAL STATEMENT: L flank pain, stone r/o    TECHNIQUE: Contiguous axial CT images were obtained from the lower chest   to the pubic symphysis without intravenous contrast.  Oral contrast was   not administered.  Reformatted images in the coronal and sagittal planes   were acquired.    COMPARISON: CT ABDOMEN 5/4/2013.    Note that the evaluation of solid organs andbowel loops is limited   secondary to lack of oral and IV contrast.    FINDINGS:    LOWER CHEST: Unremarkable.    HEPATOBILIARY: Unremarkable.    SPLEEN: Unremarkable.    PANCREAS: Unremarkable.    ADRENAL GLANDS: Unremarkable.    KIDNEYS: 4 x 3 x 6 mm left mid ureteral calculus. Mild to moderate left   hydroureteronephrosis with mild periureteral fat stranding. Additional   nonobstructing left renal calculi.    ABDOMINOPELVIC NODES: Unremarkable.    PELVIC ORGANS: Unremarkable.    PERITONEUM/MESENTERY/BOWEL: No bowel obstruction, pneumatosis,   pneumoperitoneum, or ascites.  Normal appendix.    BONES/SOFT TISSUES: No acute osseous abnormality. Small fat-containing   umbilical hernia. Bony degenerative changes.    OTHER: Normal caliber aorta.      IMPRESSION:    4 x 3 x 6 mm left mid ureteral calculus. Mild to moderate left   hydroureteronephrosis with mild periureteral fat stranding. Additional   nonobstructing left renal calculi.    --- End of Report ---            ED CHÁVEZ MD; Attending Radiologist  This document has been electronically signed. May 23 2025  5:05AM    < end of copied text >

## 2025-05-23 NOTE — H&P ADULT - ATTENDING COMMENTS
Pt in 3A -28a , family at bedside , [place] # [phone]  hx confirmed     On exam  General: awake, alert, NAD,   Lungs:  clear to ausculation b/l, normal resp effort  Heart: regular rhythm   Abdomen: soft, non tender non distended , mild left flank tenderness , no CVA tender   Ext: no edema, can move all  his extremities       I have personally seen and examined the patient.  I fully participated in the care of this patient. I DISCUSSED WITH THE RESIDENT AND I EDITED THE ABOVE NOTE

## 2025-05-23 NOTE — ED ADULT NURSE REASSESSMENT NOTE - NS ED NURSE REASSESS COMMENT FT1
Transport here to pick patient up report attempted by previous nurse unsuccessful. Called 3A again and unable to give report as nurse was in morning report according to .

## 2025-05-23 NOTE — H&P ADULT - HISTORY OF PRESENT ILLNESS
66 yo female, nonsmoker, with PMH of ureteral calculus requiring surgical intervention (2009), HTN, HLD, hypothyroidism, constipation, and depression presented due to acute onset of sharp left flank pain radiating to the left abdomen and groin. She states the pain began at around 2 am last night with a severity of 9/10. It did not resolve with positional changes or with 2 tylenol 500 mg and one pill of her 's flomax (unable to recall dose). She also reports associated dark urine for the past 2 days. She recalls no recent changes to her daily schedule but does report that she has had increased dryness of the mouth which she attributes to elevated blood sugar. She denies any sick contacts and systemic symptoms including fever, chills, n/v/d,     In the ED, she  66 yo female, nonsmoker, [place] Speaking, with PMH of ureteral calculus requiring Lithotripsy (2009), HTN, HLD, type II DM, hypothyroidism, constipation/IBS, migraine, and depression presented due to acute onset of sharp left flank pain radiating to the left abdomen and groin. She states the pain began at around 2 am last night with a severity of 9/10. It did not resolve with positional changes or with 2 tylenol 500 mg and one pill of her 's flomax (unable to recall dose). She also reports associated dark urine for the past 2 days. She denies dysuria, increased urinary frequency, or fevers/chills, She recalls no recent changes to her daily schedule but does report that she has had increased dryness of the mouth which she attributes to elevated blood sugar. She denies any sick contacts and systemic symptoms including n/v/d, chest pain or SOB.     ED course:  - Vitals: VS  - labs: cbc and cmp wnl, corrected Ca wnl, normal lactate and lipase. UA urine PH 5, +ve for RBC and WBC. no bacteria, +ve nitrite and LE  - Imaging: CT abd/pel non con:  4 x 3 x 6 mm left mid ureteral calculus. Mild to moderate left hydroureteronephrosis with mild periureteral fat stranding. Additional nonobstructing left renal calculi. Small Umbilical Hernia    Admitted for further management.

## 2025-05-23 NOTE — ED ADULT NURSE NOTE - PRIMARY CARE PROVIDER
The patient feels that the cataract is significantly impacting daily activities and has elected cataract surgery. The risks, benefits, and alternatives to surgery were discussed. The patient elects to proceed with surgery. pcp

## 2025-05-23 NOTE — ED PROVIDER NOTE - OBJECTIVE STATEMENT
[place]  [id number]. 65-year-old female with history of HTN, HLD, DM, constipation, disc herniation with past back surgery, kidney stone with past laser, presents with  with left ankle pain radiating to the left lower quadrant abdomen since 2 AM tonight, sudden onset, unimproved with position changes or Tylenol. Had some dark urine earlier without blood. Denies all other symptoms including other urinary symptoms or changes, vomiting, fever, change in chronic constipation.

## 2025-05-23 NOTE — H&P ADULT - ASSESSMENT
64 yo female, nonsmoker, with PMH of ureteral calculus requiring surgical intervention (2009), HTN, HLD, hypothyroidism, constipation, and depression presented due to acute onset of sharp left flank pain radiating to the left abdomen and groin. CT abdomen showed 6mm left mid ureteral calculus with mild to moderate hydronephrosis. Admit to medicine for further monitoring     #left sided renal colic s/o to mid ureteral calculus   -urology consult recs reviewed and appreciated   0.4 mg of flomax now and then qhs   strain urine   urine and blood cultures   maintain toradol for pain   maintain empiric antibiotics- ceftriaxone 2000 mg one dose given   maintain NPO- if patient becomes hemodynamically untable, consult urology immediately    66 yo female, nonsmoker, Hong [place]er Speaking, with PMH of ureteral calculus requiring Lithotripsy (2009), HTN, HLD, type II DM, hypothyroidism, constipation/IBS, migraine, and depression presented due to acute onset of sharp left flank pain radiating to the left abdomen and groin. CT abdomen showed 6mm left mid ureteral calculus with mild to moderate hydronephrosis. Admit for further management.     #Recurrent Nephrolithiasis   #left sided renal colic 2/2 mid ureteral calculus   - VS, SIRS -ve  -urology consult: Give Flomax 0.4mg now and then qHS, Strain urine, Send urine and blood cultures, Empiric Abx for suspected UTI, Toradol for pain  Keep NPO for now, just in case patient becomes hemodynamically unstable or spikes fever so that urology can stent immediately.    - labs: cbc and cmp wnl, corrected Ca wnl, normal lactate and lipase. UA urine PH 5, +ve for RBC and WBC. no bacteria, +ve nitrite and LE  - Imaging: CT abd/pel non con: 4 x 3 x 6 mm left mid ureteral calculus. Mild to moderate left hydroureteronephrosis with mild periureteral fat stranding. Additional nonobstructing left renal calculi. Small Umbilical Hernia.    Plan:  - Given Urine PH 5 most likely Ca oxalate vs uric acid stone, f/u uric acid level if high may need to dc HCZT   - IV hydration LR 100ml/hour for 24 hours  - Empiric abx per Urology for suspected UTI given Nitrite +ve UA despite no bacteria, no UTI symptoms   - flomax 0.4mg Qhs  - strain urine, f/u urine and blood cx  - Pain control   - Contacted Urology: Keep NPO for now  just in case patient becomes hemodynamically unstable or spikes fever so that urology can stent immediately. Contact Urology in afternoon if patient still afebrile and VS may agree to dc NPO.     #HTN  #HLD  # type II DM  # hypothyroidism  #constipation/IBS  # migraine  #depression  - c/w home meds  - SSI    Dispo MEd/Surg  DVT proph SCD for now until urology confirms no intervention by afternoon   GI proph PPI  Diet NPO for now     64 yo female, nonsmoker, Polish Speaking, with PMH of ureteral calculus requiring Lithotripsy (2009), HTN, HLD, type II DM, hypothyroidism, constipation/IBS, migraine, and depression presented due to acute onset of sharp left flank pain radiating to the left abdomen and groin. CT abdomen showed 6mm left mid ureteral calculus with mild to moderate hydronephrosis. Admit for further management.     #Recurrent Nephrolithiasis   #left sided renal colic 2/2 mid ureteral calculus   - VS, SIRS -ve  -urology consult: Give Flomax 0.4mg now and then qHS, Strain urine, Send urine and blood cultures, Empiric Abx for suspected UTI, Toradol for pain  Keep NPO for now, just in case patient becomes hemodynamically unstable or spikes fever so that urology can stent immediately.    - labs: cbc and cmp wnl, corrected Ca wnl, normal lactate and lipase. UA urine PH 5, +ve for RBC and WBC. no bacteria, +ve nitrite and LE  - Imaging: CT abd/pel non con: 4 x 3 x 6 mm left mid ureteral calculus. Mild to moderate left hydroureteronephrosis with mild periureteral fat stranding. Additional nonobstructing left renal calculi. Small Umbilical Hernia.    Plan:  - Given Urine PH 5 most likely Ca oxalate vs uric acid stone, f/u uric acid level if high may need to dc HCZT   - IV hydration LR 100ml/hour for 24 hours  - Empiric abx per Urology for suspected UTI given Nitrite +ve UA despite no bacteria, no UTI symptoms   - flomax 0.4mg Qhs  - strain urine, f/u urine and blood cx  - Pain control   - Contacted Urology: Keep NPO for now  just in case patient becomes hemodynamically unstable or spikes fever so that urology can stent immediately. Contact Urology in afternoon if patient still afebrile and VS may agree to dc NPO.     #HTN  #HLD  # type II DM  # hypothyroidism  #constipation/IBS  # migraine  #depression  - c/w home metoprolol, losartan, amlodipine, HCZT  - c/w amitriptyline and fluoxetine   - c/w levothyroxine  - sumatriptan prn for migraine attack (instead of home Ubrelvy)  - bowel regimen for IRS/constipation   - SSI    Dispo MEd/Surg  DVT proph SCD for now until urology confirms no intervention by afternoon   GI proph PPI  Diet NPO for now     64 yo female, nonsmoker, English Speaking, with PMH of ureteral calculus requiring Lithotripsy (2009), HTN, HLD, type II DM, hypothyroidism, constipation/IBS, migraine, and depression presented due to acute onset of sharp left flank pain radiating to the left abdomen and groin. CT abdomen showed 6mm left mid ureteral calculus with mild to moderate hydronephrosis. Admit for further management.       [] left side  Abd pain secondary to Left renal colic 2/2 mid ureteral calculus complicated by left hydroureteronephrosis  [] hematuria    # Recurrent Nephrolithiasis   - VS, SIRS -ve  -urology consult: Give Flomax 0.4mg now and then qHS, Strain urine, Send urine and blood cultures, Empiric Abx for suspected UTI, Toradol for pain  Keep NPO for now, just in case patient becomes hemodynamically unstable or spikes fever so that urology can stent immediately.    - labs: cbc and cmp wnl, corrected Ca wnl, normal lactate and lipase. UA urine PH 5, +ve for RBC and WBC. no bacteria, +ve nitrite and LE wbc 19  - Imaging: CT abd/pel non con: 4 x 3 x 6 mm left mid ureteral calculus. Mild to moderate left hydroureteronephrosis with mild periureteral fat stranding. Additional nonobstructing left renal calculi. Small Umbilical Hernia.    Plan:  - Given Urine PH 5 most likely Ca oxalate vs uric acid stone,   on  HCTZ which should help if calcium stones   f/u uric acid level if high may need to dc HCZT if suspected urea stone   - IV hydration s/p 1 L in the ED , C/W LR 100ml/hour for 24 hours  - Empiric abx per Urology for suspected UTI given Nitrite +ve, moderate LE, WBC 19 in the  UA despite no bacteria, no UTI symptoms   received already ceftriaxone 2gm today , c/w ceftriaxone   - flomax 0.4mg Qhs  - strain urine, f/u urine and blood cx  - Pain control   - Contacted Urology: Keep NPO for now  just in case patient becomes hemodynamically unstable or spikes fever so that urology can stent immediately. Contact Urology in afternoon if patient still afebrile and VS may agree to dc NPO.     #HTN  #HLD  # type II DM  # hypothyroidism  #constipation/IBS  # migraine  #depression  - c/w home metoprolol, losartan, amlodipine, HCZT  - c/w amitriptyline and fluoxetine   - c/w levothyroxine  - sumatriptan prn for migraine attack (instead of home Ubrelvy)  - bowel regimen for IRS/constipation   - monitor FS and  SSI- as she is NPO for now     Dispo MEd/Surg  DVT proph SCD for now until urology confirms no intervention by afternoon   GI proph PPI  Diet NPO for now     64 yo female, nonsmoker, Guinean Speaking, with PMH of ureteral calculus requiring Lithotripsy (2009), HTN, HLD, type II DM, hypothyroidism, constipation/IBS, migraine, and depression presented due to acute onset of sharp left flank pain radiating to the left abdomen and groin. CT abdomen showed 6mm left mid ureteral calculus with mild to moderate hydronephrosis. Admit for further management.       [] left side  Abd pain secondary to Left renal colic 2/2 mid ureteral calculus complicated by left hydroureteronephrosis  [] hematuria    # Recurrent Nephrolithiasis   - VS, SIRS -ve  -urology consult: Give Flomax 0.4mg now and then qHS, Strain urine, Send urine and blood cultures, Empiric Abx for suspected UTI, Toradol for pain  Keep NPO for now, just in case patient becomes hemodynamically unstable or spikes fever so that urology can stent immediately.    - labs: cbc and cmp wnl, lactate 1.6 . corrected Ca wnl, normal lactate and lipase. UA urine PH 5, +ve for RBC and WBC. no bacteria, +ve nitrite and LE wbc 19  - Imaging: CT abd/pel non con: 4 x 3 x 6 mm left mid ureteral calculus. Mild to moderate left hydroureteronephrosis with mild periureteral fat stranding. Additional nonobstructing left renal calculi. Small Umbilical Hernia.    Plan:  - Given Urine PH 5 most likely Ca oxalate vs uric acid stone,   on  HCTZ which should help if calcium stones   f/u uric acid level if high may need to dc HCZT if suspected urea stone   - IV hydration s/p 1 L in the ED , C/W LR 100ml/hour for 24 hours  - Empiric abx per Urology for suspected UTI given Nitrite +ve, moderate LE, WBC 19 in the  UA despite no bacteria, no UTI symptoms   received already ceftriaxone 2gm today , c/w ceftriaxone   - flomax 0.4mg Qhs  - strain urine, f/u urine and blood cx  - Pain control   - Contacted Urology: Keep NPO for now  just in case patient becomes hemodynamically unstable or spikes fever so that urology can stent immediately. Contact Urology in afternoon if patient still afebrile and VS may agree to dc NPO.     #HTN  #HLD  # type II DM  # hypothyroidism  #constipation/IBS  # migraine  #depression  - c/w home metoprolol, losartan, amlodipine, HCZT  - c/w amitriptyline and fluoxetine   - c/w levothyroxine  - sumatriptan prn for migraine attack (instead of home Ubrelvy)  - bowel regimen for IRS/constipation   - monitor FS and  SSI- as she is NPO for now     Dispo MEd/Surg  DVT proph SCD for now until urology confirms no intervention by afternoon   GI proph PPI  Diet NPO for now     66 yo female, nonsmoker, Armenian Speaking, with PMH of ureteral calculus requiring Lithotripsy (2009), HTN, HLD, type II DM, hypothyroidism, constipation/IBS, migraine, and depression presented due to acute onset of sharp left flank pain radiating to the left abdomen and groin. CT abdomen showed 6mm left mid ureteral calculus with mild to moderate hydronephrosis. Admit for further management.       [] left side  Abd pain secondary to Left renal colic 2/2 mid ureteral calculus complicated by left hydroureteronephrosis  [] hematuria    # Recurrent Nephrolithiasis   - VS, SIRS -ve  -urology consult: Give Flomax 0.4mg now and then qHS, Strain urine, Send urine and blood cultures, Empiric Abx for suspected UTI, Toradol for pain  Keep NPO for now, just in case patient becomes hemodynamically unstable or spikes fever so that urology can stent immediately.    - labs: cbc and cmp wnl, lactate 1.6 . corrected Ca wnl, normal lactate and lipase. UA urine PH 5, +ve for RBC and WBC. no bacteria, +ve nitrite and LE wbc 19  - Imaging: CT abd/pel non con: 4 x 3 x 6 mm left mid ureteral calculus. Mild to moderate left hydroureteronephrosis with mild periureteral fat stranding. Additional nonobstructing left renal calculi. Small Umbilical Hernia.    Plan:  - Given Urine PH 5 most likely Ca oxalate vs uric acid stone,   on  HCTZ which should help if calcium stones   f/u uric acid level if high may need to dc HCZT if suspected urea stone   - IV hydration s/p 1 L in the ED , C/W LR 100ml/hour for 24 hours  - Empiric abx per Urology for suspected UTI given Nitrite +ve, moderate LE, WBC 19 in the  UA despite no bacteria, no UTI symptoms   received already ceftriaxone 2gm today , c/w ceftriaxone   - flomax 0.4mg Qhs  - strain urine, f/u urine and blood cx  - Pain control   - Contacted Urology: Keep NPO for now  just in case patient becomes hemodynamically unstable or spikes fever so that urology can stent immediately. Contact Urology in afternoon if patient still afebrile and VS may agree to dc NPO.     #HTN  #HLD  # type II DM  # hypothyroidism  #constipation/IBS  # migraine  #depression  - c/w home metoprolol, losartan, amlodipine, HCZT- HOLD Blood Pressure MEDS IF PATIENT BECOME SEPTIC OR Unstable   - c/w amitriptyline and fluoxetine   - c/w levothyroxine  - sumatriptan prn for migraine attack (instead of home Ubrelvy)  - bowel regimen for IRS/constipation   - monitor FS and  SSI- as she is NPO for now     Dispo MEd/Surg  DVT proph SCD for now until urology confirms no intervention by afternoon   GI proph PPI  Diet NPO for now

## 2025-05-23 NOTE — CONSULT NOTE ADULT - PROBLEM SELECTOR RECOMMENDATION 9
IV hydration  Give Flomax 0.4mg now and then qHS  Strain urine  Send urine and blood cultures  Empiric Abx  Toradol for pain  Keep NPO for now, if patient becomes hemodynamically unstable or spikes fever, notify Urology housestaff immediately  Case discussed with Attending and agrees with above plan

## 2025-05-24 LAB
A1C WITH ESTIMATED AVERAGE GLUCOSE RESULT: 5.9 % — HIGH (ref 4–5.6)
ALBUMIN SERPL ELPH-MCNC: 3.5 G/DL — SIGNIFICANT CHANGE UP (ref 3.5–5.2)
ALP SERPL-CCNC: 45 U/L — SIGNIFICANT CHANGE UP (ref 30–115)
ALT FLD-CCNC: 24 U/L — SIGNIFICANT CHANGE UP (ref 0–41)
ANION GAP SERPL CALC-SCNC: 9 MMOL/L — SIGNIFICANT CHANGE UP (ref 7–14)
AST SERPL-CCNC: 18 U/L — SIGNIFICANT CHANGE UP (ref 0–41)
BASOPHILS # BLD AUTO: 0.02 K/UL — SIGNIFICANT CHANGE UP (ref 0–0.2)
BASOPHILS NFR BLD AUTO: 0.4 % — SIGNIFICANT CHANGE UP (ref 0–1)
BILIRUB SERPL-MCNC: 0.3 MG/DL — SIGNIFICANT CHANGE UP (ref 0.2–1.2)
BUN SERPL-MCNC: 10 MG/DL — SIGNIFICANT CHANGE UP (ref 10–20)
CALCIUM SERPL-MCNC: 9.4 MG/DL — SIGNIFICANT CHANGE UP (ref 8.4–10.5)
CHLORIDE SERPL-SCNC: 107 MMOL/L — SIGNIFICANT CHANGE UP (ref 98–110)
CO2 SERPL-SCNC: 26 MMOL/L — SIGNIFICANT CHANGE UP (ref 17–32)
CREAT SERPL-MCNC: 0.8 MG/DL — SIGNIFICANT CHANGE UP (ref 0.7–1.5)
CULTURE RESULTS: SIGNIFICANT CHANGE UP
EGFR: 82 ML/MIN/1.73M2 — SIGNIFICANT CHANGE UP
EGFR: 82 ML/MIN/1.73M2 — SIGNIFICANT CHANGE UP
EOSINOPHIL # BLD AUTO: 0.08 K/UL — SIGNIFICANT CHANGE UP (ref 0–0.7)
EOSINOPHIL NFR BLD AUTO: 1.8 % — SIGNIFICANT CHANGE UP (ref 0–8)
ESTIMATED AVERAGE GLUCOSE: 123 MG/DL — HIGH (ref 68–114)
GLUCOSE BLDC GLUCOMTR-MCNC: 107 MG/DL — HIGH (ref 70–99)
GLUCOSE BLDC GLUCOMTR-MCNC: 108 MG/DL — HIGH (ref 70–99)
GLUCOSE BLDC GLUCOMTR-MCNC: 142 MG/DL — HIGH (ref 70–99)
GLUCOSE SERPL-MCNC: 105 MG/DL — HIGH (ref 70–99)
HCT VFR BLD CALC: 36.7 % — LOW (ref 37–47)
HGB BLD-MCNC: 12.3 G/DL — SIGNIFICANT CHANGE UP (ref 12–16)
IMM GRANULOCYTES NFR BLD AUTO: 0.2 % — SIGNIFICANT CHANGE UP (ref 0.1–0.3)
LYMPHOCYTES # BLD AUTO: 2.23 K/UL — SIGNIFICANT CHANGE UP (ref 1.2–3.4)
LYMPHOCYTES # BLD AUTO: 49.4 % — SIGNIFICANT CHANGE UP (ref 20.5–51.1)
MAGNESIUM SERPL-MCNC: 1.7 MG/DL — LOW (ref 1.8–2.4)
MCHC RBC-ENTMCNC: 29.8 PG — SIGNIFICANT CHANGE UP (ref 27–31)
MCHC RBC-ENTMCNC: 33.5 G/DL — SIGNIFICANT CHANGE UP (ref 32–37)
MCV RBC AUTO: 88.9 FL — SIGNIFICANT CHANGE UP (ref 81–99)
MONOCYTES # BLD AUTO: 0.48 K/UL — SIGNIFICANT CHANGE UP (ref 0.1–0.6)
MONOCYTES NFR BLD AUTO: 10.6 % — HIGH (ref 1.7–9.3)
NEUTROPHILS # BLD AUTO: 1.69 K/UL — SIGNIFICANT CHANGE UP (ref 1.4–6.5)
NEUTROPHILS NFR BLD AUTO: 37.6 % — LOW (ref 42.2–75.2)
NRBC BLD AUTO-RTO: 0 /100 WBCS — SIGNIFICANT CHANGE UP (ref 0–0)
PHOSPHATE SERPL-MCNC: 3.7 MG/DL — SIGNIFICANT CHANGE UP (ref 2.1–4.9)
PLATELET # BLD AUTO: 285 K/UL — SIGNIFICANT CHANGE UP (ref 130–400)
PMV BLD: 8.7 FL — SIGNIFICANT CHANGE UP (ref 7.4–10.4)
POTASSIUM SERPL-MCNC: 3.9 MMOL/L — SIGNIFICANT CHANGE UP (ref 3.5–5)
POTASSIUM SERPL-SCNC: 3.9 MMOL/L — SIGNIFICANT CHANGE UP (ref 3.5–5)
PROT SERPL-MCNC: 5.1 G/DL — LOW (ref 6–8)
RBC # BLD: 4.13 M/UL — LOW (ref 4.2–5.4)
RBC # FLD: 13.1 % — SIGNIFICANT CHANGE UP (ref 11.5–14.5)
SODIUM SERPL-SCNC: 142 MMOL/L — SIGNIFICANT CHANGE UP (ref 135–146)
SPECIMEN SOURCE: SIGNIFICANT CHANGE UP
WBC # BLD: 4.51 K/UL — LOW (ref 4.8–10.8)
WBC # FLD AUTO: 4.51 K/UL — LOW (ref 4.8–10.8)

## 2025-05-24 PROCEDURE — 99231 SBSQ HOSP IP/OBS SF/LOW 25: CPT

## 2025-05-24 PROCEDURE — 99232 SBSQ HOSP IP/OBS MODERATE 35: CPT

## 2025-05-24 RX ORDER — MAGNESIUM SULFATE 500 MG/ML
2 SYRINGE (ML) INJECTION ONCE
Refills: 0 | Status: DISCONTINUED | OUTPATIENT
Start: 2025-05-24 | End: 2025-05-25

## 2025-05-24 RX ADMIN — LOSARTAN POTASSIUM 50 MILLIGRAM(S): 100 TABLET, FILM COATED ORAL at 05:59

## 2025-05-24 RX ADMIN — Medication 81 MILLIGRAM(S): at 12:05

## 2025-05-24 RX ADMIN — CEFTRIAXONE 100 MILLIGRAM(S): 500 INJECTION, POWDER, FOR SOLUTION INTRAMUSCULAR; INTRAVENOUS at 06:06

## 2025-05-24 RX ADMIN — Medication 50 MICROGRAM(S): at 06:02

## 2025-05-24 RX ADMIN — Medication 2 TABLET(S): at 21:51

## 2025-05-24 RX ADMIN — AMITRIPTYLINE HYDROCHLORIDE 10 MILLIGRAM(S): 25 TABLET, FILM COATED ORAL at 12:05

## 2025-05-24 RX ADMIN — PERPHENAZINE 2 MILLIGRAM(S): 2 TABLET, FILM COATED ORAL at 21:52

## 2025-05-24 RX ADMIN — ATORVASTATIN CALCIUM 20 MILLIGRAM(S): 80 TABLET, FILM COATED ORAL at 21:51

## 2025-05-24 RX ADMIN — FLUOXETINE HYDROCHLORIDE 20 MILLIGRAM(S): 20 CAPSULE ORAL at 12:05

## 2025-05-24 RX ADMIN — Medication 5 MILLIGRAM(S): at 21:52

## 2025-05-24 RX ADMIN — Medication 3 MILLIGRAM(S): at 22:03

## 2025-05-24 RX ADMIN — METOPROLOL SUCCINATE 50 MILLIGRAM(S): 50 TABLET, EXTENDED RELEASE ORAL at 05:59

## 2025-05-24 RX ADMIN — AMLODIPINE BESYLATE 5 MILLIGRAM(S): 10 TABLET ORAL at 05:59

## 2025-05-24 RX ADMIN — TAMSULOSIN HYDROCHLORIDE 0.4 MILLIGRAM(S): 0.4 CAPSULE ORAL at 21:51

## 2025-05-24 RX ADMIN — Medication 2 MILLIGRAM(S): at 06:47

## 2025-05-24 RX ADMIN — Medication 40 MILLIGRAM(S): at 05:59

## 2025-05-24 NOTE — PROGRESS NOTE ADULT - SUBJECTIVE AND OBJECTIVE BOX
CLAUDIO TORRES  [facility] 028 A ([facility])    Patient was evaluated and examined  by bedside, c/o left abdominal pain, resolved hematuria         REVIEW OF SYSTEMS:  please see pertinent positives mentioned above, all other 12 ROS negative      T(C): , Max: 36.9 (05-24-25 @ 05:10)  HR: 79 (05-24-25 @ 05:10)  BP: 123/77 (05-24-25 @ 05:10)  RR: 19 (05-23-25 @ 20:07)  SpO2: 92% (05-24-25 @ 05:10)  CAPILLARY BLOOD GLUCOSE      POCT Blood Glucose.: 142 mg/dL (24 May 2025 12:25)  POCT Blood Glucose.: 102 mg/dL (24 May 2025 07:37)  POCT Blood Glucose.: 91 mg/dL (23 May 2025 20:52)  POCT Blood Glucose.: 71 mg/dL (23 May 2025 16:51)      PHYSICAL EXAM:  General: NAD, AAOX3, patient is laying comfortably in bed  HEENT: AT, NC, Supple, NO JVD, NO CB  Lungs: CTA B/L, no wheezing, no rhonchi  CVS: normal S1, S2, RRR, NO M/G/R  Abdomen: soft, bowel sounds present, left sided tenderness, non-distended  Extremities: no edema, no clubbing, no cyanosis, positive peripheral pulses b/l  Neuro: no acute focal neurological deficits  Skin: no rash, no ecchymosis      LAB  CBC  Date: 05-24-25 @ 07:04  Mean cell Kppkvqntrq49.8  Mean cell Hemoglobin Conc33.5  Mean cell Volum 88.9  Platelet count-Automate 285  RBC Count 4.13  Red Cell Distrib Width13.1  WBC Count4.51  % Albumin, Urine--  Hematocrit 36.7  Hemoglobin 12.3  CBC  Date: 05-23-25 @ 05:10  Mean cell Xeqtkbilzr84.2  Mean cell Hemoglobin Conc33.9  Mean cell Volum 88.9  Platelet count-Automate 303  RBC Count 4.41  Red Cell Distrib Width12.8  WBC Count5.61  % Albumin, Urine--  Hematocrit 39.2  Hemoglobin 13.3    BMP  05-24-25 @ 07:04  Blood Gas Arterial-Calcium,Ionized--  Blood Urea Nitrogen, Serum 10 mg/dL [10 - 20]  Carbon Dioxide, Serum26 mmol/L [17 - 32]  Chloride, [id number] mmol/L [98 - 110]  Creatinie, Serum0.8 mg/dL [0.7 - 1.5]  Glucose, Bhzto533 mg/dL[H] [70 - 99]  Potassium, Serum3.9 mmol/L [3.5 - 5.0]  Sodium, Serum 142 mmol/L [135 - 146]  BMP  05-23-25 @ 05:10  Blood Gas Arterial-Calcium,Ionized--  Blood Urea Nitrogen, Serum 14 mg/dL [10 - 20]  Carbon Dioxide, Serum24 mmol/L [17 - 32]  Chloride, [id number] mmol/L [98 - 110]  Creatinie, Serum0.8 mg/dL [0.7 - 1.5]  Glucose, Hurhl734 mg/dL[H] [70 - 99]  Potassium, Serum3.8 mmol/L [3.5 - 5.0]  Sodium, Serum 141 mmol/L [135 - 146]        Microbiology:    Urinalysis with Rflx Culture (collected 05-23-25 @ 04:35)        Medications:  acetaminophen     Tablet .. 650 milliGRAM(s) Oral every 6 hours PRN  aluminum hydroxide/magnesium hydroxide/simethicone Suspension 30 milliLiter(s) Oral every 4 hours PRN  amitriptyline 10 milliGRAM(s) Oral daily  amLODIPine   Tablet 5 milliGRAM(s) Oral daily  aspirin  chewable 81 milliGRAM(s) Oral daily  atorvastatin 20 milliGRAM(s) Oral at bedtime  bisacodyl 5 milliGRAM(s) Oral at bedtime  cefTRIAXone   IVPB 1000 milliGRAM(s) IV Intermittent every 24 hours  dextrose 5%. 1000 milliLiter(s) IV Continuous <Continuous>  dextrose 5%. 1000 milliLiter(s) IV Continuous <Continuous>  dextrose 50% Injectable 25 Gram(s) IV Push once  dextrose 50% Injectable 12.5 Gram(s) IV Push once  dextrose 50% Injectable 25 Gram(s) IV Push once  dextrose Oral Gel 15 Gram(s) Oral once PRN  FLUoxetine 20 milliGRAM(s) Oral daily  glucagon  Injectable 1 milliGRAM(s) IntraMuscular once  insulin lispro (ADMELOG) corrective regimen sliding scale   SubCutaneous three times a day before meals  ketorolac   Injectable 15 milliGRAM(s) IV Push every 8 hours PRN  lactated ringers. 1000 milliLiter(s) IV Continuous <Continuous>  levothyroxine 50 MICROGram(s) Oral daily  losartan 50 milliGRAM(s) Oral daily  magnesium sulfate  IVPB 2 Gram(s) IV Intermittent once  melatonin 3 milliGRAM(s) Oral at bedtime PRN  metoprolol succinate ER 50 milliGRAM(s) Oral daily  morphine  - Injectable 2 milliGRAM(s) IV Push every 6 hours PRN  ondansetron Injectable 4 milliGRAM(s) IV Push every 8 hours PRN  pantoprazole    Tablet 40 milliGRAM(s) Oral before breakfast  perphenazine 2 milliGRAM(s) Oral at bedtime  polyethylene glycol 3350 17 Gram(s) Oral every 12 hours PRN  senna 2 Tablet(s) Oral at bedtime  SUMAtriptan 50 milliGRAM(s) Oral daily PRN  tamsulosin 0.4 milliGRAM(s) Oral at bedtime        Assessment and Plan:  Patient is a 64 y/o female, nonsmoker, Tu[place] Speaking, with PMH of ureteral calculus requiring Lithotripsy (2009), HTN, HLD, type II DM, hypothyroidism, constipation/IBS, migraine, and depression presented due to acute onset of sharp left flank pain radiating to the left abdomen and groin. CT abdomen showed 6mm left mid ureteral calculus with mild to moderate hydronephrosis. Admit for further management.       # Acute left sided  Abd pain secondary to Left renal colic 2/2 mid ureteral calculus complicated by left hydroureteronephrosis   #hematuria    # Recurrent Nephrolithiasis   -urology consult:  Flomax 0.4mg now and then qHS, Strain urine, Send urine and blood cultures, Empiric Abx for suspected UTI, Toradol for pain  - Imaging: CT abd/pel non con: 4 x 3 x 6 mm left mid ureteral calculus. Mild to moderate left hydroureteronephrosis with mild periureteral fat stranding. Additional nonobstructing left renal calculi. Small Umbilical Hernia.  - d/c HCTZ   - IVF for next 24 hrs   - IV Rocephin tx. , f/up urine cxs    - Pain control       #HTN  #HLD  # type II DM- well controlled, hga1c 5.9  # hypothyroidism  #constipation/IBS  # migraine  #depression  - c/w home metoprolol, losartan, amlodipine  - c/w amitriptyline and fluoxetine   - c/w levothyroxine  - sumatriptan prn for migraine attack (instead of home Ubrelvy)  - bowel regimen for IRS/constipation   - bsfs monitoring     Dispo MEd/Surg  DVT proph: Lovenox subc. tx.    #Progress Note Handoff: f/up urine cxs, IVF , f/up cbc, bmp, mg in am , IV abx. tx. , pain control   Family discussion: current medical plan of tx. d/w pt. by bedside Disposition: Home_in 24 hrs     Total time spent to complete patient's bedside assessment, review medical chart, discuss medical plan of care with covering medical team was more than 35 minutes with >50% of time spent face to face with patient, discussion with patient/family and/or coordination of care            
UROLOGY: Pt is a 64y/o F a/w mild LEFT hydro 2/2 a 4 x 3 x 6mm LEFT midureteral stone and UTI. Pt seen and examined at bedside with Dr. Powell. Pt reports minimal LLQ pain, improved from yesterday, denies fever, nausea, vomiting, or any LUTS at this time.     REVIEW OF SYSTEMS   [x] A ten-point review of systems was otherwise negative except as noted.    Vital Signs Last 24 Hrs  T(C): 36.9 (24 May 2025 05:10), Max: 36.9 (24 May 2025 05:10)  T(F): 98.5 (24 May 2025 05:10), Max: 98.5 (24 May 2025 05:10)  HR: 79 (24 May 2025 05:10) (74 - 79)  BP: 123/77 (24 May 2025 05:10) (123/77 - 136/81)  BP(mean): 97 (23 May 2025 15:29) (97 - 97)  RR: 19 (23 May 2025 20:07) (18 - 19)  SpO2: 92% (24 May 2025 05:10) (92% - 97%)    PHYSICAL EXAM:  GEN: NAD, awake and alert.  SKIN: Good color, non diaphoretic.  RESP: Non-labored breathing. No use of accessory muscles.  CARDIO: +S1/S2  ABDO: Soft, NT/ND, no palpable bladder, no suprapubic tenderness  BACK: No CVAT B/L  : voiding freely.   EXT: OCONNOR x 4    LABS:             12.3   4.51  )-----------( 285      ( 24 May 2025 07:04 )             36.7     142  |  107  |  10  ----------------------------<  105[H]  3.9   |  26  |  0.8    Urinalysis Basic - ( 24 May 2025 07:04 )  Color: x / Appearance: x / SG: x / pH: x  Gluc: 105 mg/dL / Ketone: x  / Bili: x / Urobili: x   Blood: x / Protein: x / Nitrite: x   Leuk Esterase: x / RBC: x / WBC x   Sq Epi: x / Non Sq Epi: x / Bacteria: x

## 2025-05-25 VITALS
TEMPERATURE: 98 F | SYSTOLIC BLOOD PRESSURE: 107 MMHG | DIASTOLIC BLOOD PRESSURE: 71 MMHG | RESPIRATION RATE: 18 BRPM | HEART RATE: 80 BPM

## 2025-05-25 LAB
ANION GAP SERPL CALC-SCNC: 11 MMOL/L — SIGNIFICANT CHANGE UP (ref 7–14)
BUN SERPL-MCNC: 10 MG/DL — SIGNIFICANT CHANGE UP (ref 10–20)
CALCIUM SERPL-MCNC: 10 MG/DL — SIGNIFICANT CHANGE UP (ref 8.4–10.5)
CHLORIDE SERPL-SCNC: 101 MMOL/L — SIGNIFICANT CHANGE UP (ref 98–110)
CO2 SERPL-SCNC: 28 MMOL/L — SIGNIFICANT CHANGE UP (ref 17–32)
CREAT SERPL-MCNC: 0.8 MG/DL — SIGNIFICANT CHANGE UP (ref 0.7–1.5)
EGFR: 82 ML/MIN/1.73M2 — SIGNIFICANT CHANGE UP
EGFR: 82 ML/MIN/1.73M2 — SIGNIFICANT CHANGE UP
GLUCOSE BLDC GLUCOMTR-MCNC: 109 MG/DL — HIGH (ref 70–99)
GLUCOSE BLDC GLUCOMTR-MCNC: 125 MG/DL — HIGH (ref 70–99)
GLUCOSE SERPL-MCNC: 102 MG/DL — HIGH (ref 70–99)
HCT VFR BLD CALC: 37 % — SIGNIFICANT CHANGE UP (ref 37–47)
HGB BLD-MCNC: 12.6 G/DL — SIGNIFICANT CHANGE UP (ref 12–16)
MAGNESIUM SERPL-MCNC: 1.7 MG/DL — LOW (ref 1.8–2.4)
MCHC RBC-ENTMCNC: 30 PG — SIGNIFICANT CHANGE UP (ref 27–31)
MCHC RBC-ENTMCNC: 34.1 G/DL — SIGNIFICANT CHANGE UP (ref 32–37)
MCV RBC AUTO: 88.1 FL — SIGNIFICANT CHANGE UP (ref 81–99)
NRBC BLD AUTO-RTO: 0 /100 WBCS — SIGNIFICANT CHANGE UP (ref 0–0)
PLATELET # BLD AUTO: 298 K/UL — SIGNIFICANT CHANGE UP (ref 130–400)
PMV BLD: 8.6 FL — SIGNIFICANT CHANGE UP (ref 7.4–10.4)
POTASSIUM SERPL-MCNC: 3.6 MMOL/L — SIGNIFICANT CHANGE UP (ref 3.5–5)
POTASSIUM SERPL-SCNC: 3.6 MMOL/L — SIGNIFICANT CHANGE UP (ref 3.5–5)
RBC # BLD: 4.2 M/UL — SIGNIFICANT CHANGE UP (ref 4.2–5.4)
RBC # FLD: 13.1 % — SIGNIFICANT CHANGE UP (ref 11.5–14.5)
SODIUM SERPL-SCNC: 140 MMOL/L — SIGNIFICANT CHANGE UP (ref 135–146)
WBC # BLD: 6.61 K/UL — SIGNIFICANT CHANGE UP (ref 4.8–10.8)
WBC # FLD AUTO: 6.61 K/UL — SIGNIFICANT CHANGE UP (ref 4.8–10.8)

## 2025-05-25 PROCEDURE — 99239 HOSP IP/OBS DSCHRG MGMT >30: CPT

## 2025-05-25 RX ORDER — HYDROCHLOROTHIAZIDE 50 MG/1
1 TABLET ORAL
Refills: 0 | DISCHARGE

## 2025-05-25 RX ORDER — TAMSULOSIN HYDROCHLORIDE 0.4 MG/1
0.4 CAPSULE ORAL ONCE
Refills: 0 | Status: COMPLETED | OUTPATIENT
Start: 2025-05-25 | End: 2025-05-25

## 2025-05-25 RX ORDER — ALFUZOSIN HYDROCHLORIDE 10 MG/1
1 TABLET, EXTENDED RELEASE ORAL
Refills: 0 | DISCHARGE

## 2025-05-25 RX ORDER — TAMSULOSIN HYDROCHLORIDE 0.4 MG/1
1 CAPSULE ORAL
Qty: 30 | Refills: 0
Start: 2025-05-25 | End: 2025-06-23

## 2025-05-25 RX ORDER — CEFUROXIME SODIUM 1.5 G
1 VIAL (EA) INJECTION
Qty: 8 | Refills: 0
Start: 2025-05-25 | End: 2025-05-28

## 2025-05-25 RX ORDER — MAGNESIUM SULFATE 500 MG/ML
2 SYRINGE (ML) INJECTION ONCE
Refills: 0 | Status: DISCONTINUED | OUTPATIENT
Start: 2025-05-25 | End: 2025-05-25

## 2025-05-25 RX ADMIN — AMLODIPINE BESYLATE 5 MILLIGRAM(S): 10 TABLET ORAL at 05:32

## 2025-05-25 RX ADMIN — AMITRIPTYLINE HYDROCHLORIDE 10 MILLIGRAM(S): 25 TABLET, FILM COATED ORAL at 12:16

## 2025-05-25 RX ADMIN — LOSARTAN POTASSIUM 50 MILLIGRAM(S): 100 TABLET, FILM COATED ORAL at 05:31

## 2025-05-25 RX ADMIN — METOPROLOL SUCCINATE 50 MILLIGRAM(S): 50 TABLET, EXTENDED RELEASE ORAL at 05:32

## 2025-05-25 RX ADMIN — Medication 40 MILLIGRAM(S): at 05:31

## 2025-05-25 RX ADMIN — Medication 81 MILLIGRAM(S): at 12:16

## 2025-05-25 RX ADMIN — TAMSULOSIN HYDROCHLORIDE 0.4 MILLIGRAM(S): 0.4 CAPSULE ORAL at 12:38

## 2025-05-25 RX ADMIN — CEFTRIAXONE 100 MILLIGRAM(S): 500 INJECTION, POWDER, FOR SOLUTION INTRAMUSCULAR; INTRAVENOUS at 05:32

## 2025-05-25 RX ADMIN — Medication 50 MICROGRAM(S): at 05:31

## 2025-05-25 RX ADMIN — FLUOXETINE HYDROCHLORIDE 20 MILLIGRAM(S): 20 CAPSULE ORAL at 12:40

## 2025-05-25 NOTE — DISCHARGE NOTE PROVIDER - NSDCFUSCHEDAPPT_GEN_ALL_CORE_FT
Vignesh Chirinos  [facility] Physician [facility]  [address]  Scheduled Appointment: 06/12/2025

## 2025-05-25 NOTE — DISCHARGE NOTE PROVIDER - CARE PROVIDER_API CALL
Mariel Powell  Urology  [address] [place] [id number]  Phone: [phone]  Fax: [phone]  Follow Up Time: 1 week

## 2025-05-25 NOTE — DISCHARGE NOTE PROVIDER - NSDCMRMEDTOKEN_GEN_ALL_CORE_FT
Ajovy 225 mg/1.5 mL subcutaneous solution: 225 milligram(s) subcutaneously once a month  amitriptyline 10 mg oral tablet: 1 tab(s) orally once a day  amLODIPine 5 mg oral tablet: 1 tab(s) orally once a day  aspirin 81 mg oral tablet: 1 tab(s) orally once a day  atorvastatin 20 mg oral tablet: 1 tab(s) orally once a day (at bedtime)  bisacodyl 5 mg oral tablet: 1 tab(s) orally 2 times a day  cefuroxime 250 mg oral tablet: 1 tab(s) orally 2 times a day  FLUoxetine 20 mg oral tablet: 1 tab(s) orally once a day  irbesartan 150 mg oral tablet: 1 tab(s) orally once a day  linaclotide 290 mcg oral capsule: 1 cap(s) orally once a day  metoprolol succinate 50 mg oral tablet, extended release: 1 tab(s) orally once a day  Mounjaro 5 mg/0.5 mL subcutaneous solution: 5 milligram(s) subcutaneously once a week  perphenazine 2 mg oral tablet: 1 tab(s) orally once a day  Protonix 40 mg oral delayed release tablet: 1 tab(s) orally once a day  Synthroid 50 mcg (0.05 mg) oral tablet: 1 tab(s) orally once a day  tamsulosin 0.4 mg oral capsule: 1 cap(s) orally once a day (at bedtime)  Ubrelvy 100 mg oral tablet: 1 tab(s) orally once a day as needed for  headache

## 2025-05-25 NOTE — DISCHARGE NOTE PROVIDER - HOSPITAL COURSE
Patient is a 64 y/o female, nonsmoker, Vincentian Speaking, with PMH of ureteral calculus requiring Lithotripsy (2009), HTN, HLD, type II DM, hypothyroidism, constipation/IBS, migraine, and depression presented due to acute onset of sharp left flank pain radiating to the left abdomen and groin. CT abdomen showed 6mm left mid ureteral calculus with mild to moderate hydronephrosis. Admit for further management.       # Acute left sided  Abd pain secondary to Left renal colic 2/2 mid ureteral calculus complicated by left hydroureteronephrosis   #hematuria - resolved  # Recurrent Nephrolithiasis   -Urology team recs:  Flomax 0.4mg now and then qHS, Strain urine, Send urine and blood cultures, Empiric Abx for suspected UTI, Toradol for pain  - Imaging: CT abd/pel non con: 4 x 3 x 6 mm left mid ureteral calculus. Mild to moderate left hydroureteronephrosis with mild periureteral fat stranding. Additional nonobstructing left renal calculi. Small Umbilical Hernia.  - d/c HCTZ   - s/p IVF   - during inpatient stay patient was tx. with IV Rocephin tx. , urine cxs  - negative, upon d/c will prescribe PO Ceftin for total of 7 days course therapy   - on d/c prescribed Flomax 0.4 mg po once daily at bedtime   - patient was instructed to follow up with Urology specialist post d/c home for further outpatient management of left hydroureteronephrosis      #HTN  #HLD  # type II DM- well controlled, hga1c 5.9  # hypothyroidism  #constipation/IBS  # migraine  #depression  - c/w home metoprolol, losartan, amlodipine  - c/w amitriptyline and fluoxetine   - c/w levothyroxine  - sumatriptan prn for migraine attack (instead of home Ubrelvy)  - bowel regimen for IRS/constipation

## 2025-05-25 NOTE — DISCHARGE NOTE NURSING/CASE MANAGEMENT/SOCIAL WORK - PATIENT PORTAL LINK FT
You can access the FollowMyHealth Patient Portal offered by [facility] by registering at the following website: [email or web address]. By joining [facility]’s FollowMyHealth portal, you will also be able to view your health information using other applications (apps) compatible with our system.

## 2025-05-25 NOTE — DISCHARGE NOTE PROVIDER - NSDCCPCAREPLAN_GEN_ALL_CORE_FT
PRINCIPAL DISCHARGE DIAGNOSIS  Diagnosis: Left ureteral stone  Assessment and Plan of Treatment: You have been treated for left renal stone colic, post Urology specialist evaluation, you was started on daily Flomax therapy. Continue to drink moderate amount of fluid at home. Urine culture was negative for any bacterial growth. Please follow up with Urology specialist post discharge home for further outpatient management and therapy of left renal stone. If you develop recurrent bloody colored urine, worsening abominal pain, pain during urinary seek medical attention immediately and return to Emergency Room for further treatment.      SECONDARY DISCHARGE DIAGNOSES  Diagnosis: Acute UTI  Assessment and Plan of Treatment:

## 2025-05-25 NOTE — DISCHARGE NOTE PROVIDER - ATTENDING DISCHARGE PHYSICAL EXAMINATION:
Vital Signs Last 24 Hrs  T(C): 36.5 (25 May 2025 05:00), Max: 36.5 (25 May 2025 05:00)  T(F): 97.7 (25 May 2025 05:00), Max: 97.7 (25 May 2025 05:00)  HR: 71 (25 May 2025 05:00) (71 - 71)  BP: 129/76 (25 May 2025 05:00) (129/76 - 129/76)  RR: 18 (25 May 2025 05:00) (18 - 18)  SpO2: 92% (25 May 2025 05:00) (92% - 92%)    O2 Parameters below as of 25 May 2025 05:00  Patient On (Oxygen Delivery Method): room air    Physical Exam:  GENERAL: NAD, AAOX3, patient is laying comfortably in bed  HEENT: AT, NC, PERRLA, SUPPLE, NO JVD, NO CB  LUNG: CTA B/L  CVS: normal S1, S2, RRR, NO M/G/R  ABDOMEN: soft, bowel sounds present, normoactive in all 4 quadrants, non-tender, non-distended  EXT: no E/C/C, positive PP b/l extremities  NEURO: no acute focal neurological deficits  SKIN: no rash, no ecchymosis

## 2025-05-25 NOTE — DISCHARGE NOTE NURSING/CASE MANAGEMENT/SOCIAL WORK - FINANCIAL ASSISTANCE
[facility] provides services at a reduced cost to those who are determined to be eligible through [facility]’s financial assistance program. Information regarding [facility]’s financial assistance program can be found by going to [email or web address] or by calling [phone].

## 2025-05-31 DIAGNOSIS — K58.1 IRRITABLE BOWEL SYNDROME WITH CONSTIPATION: ICD-10-CM

## 2025-05-31 DIAGNOSIS — F32.A DEPRESSION, UNSPECIFIED: ICD-10-CM

## 2025-05-31 DIAGNOSIS — N20.1 CALCULUS OF URETER: ICD-10-CM

## 2025-05-31 DIAGNOSIS — E11.9 TYPE 2 DIABETES MELLITUS WITHOUT COMPLICATIONS: ICD-10-CM

## 2025-05-31 DIAGNOSIS — E03.9 HYPOTHYROIDISM, UNSPECIFIED: ICD-10-CM

## 2025-05-31 DIAGNOSIS — G43.909 MIGRAINE, UNSPECIFIED, NOT INTRACTABLE, WITHOUT STATUS MIGRAINOSUS: ICD-10-CM

## 2025-05-31 DIAGNOSIS — N13.6 PYONEPHROSIS: ICD-10-CM

## 2025-05-31 DIAGNOSIS — I10 ESSENTIAL (PRIMARY) HYPERTENSION: ICD-10-CM

## 2025-06-09 NOTE — ED CDU PROVIDER DISPOSITION NOTE - CONDITION AT DISCHARGE:
Pt arrives w co constipation x 1 week.   Reports cramping, and RLQ pain. Pain in R flank pain.   States needing digital disimpaction 1 month ago.   
Satisfactory

## 2025-06-12 ENCOUNTER — APPOINTMENT (OUTPATIENT)
Dept: ORTHOPEDIC SURGERY | Facility: CLINIC | Age: 65
End: 2025-06-12

## 2025-08-27 PROBLEM — I10 ESSENTIAL (PRIMARY) HYPERTENSION: Chronic | Status: ACTIVE | Noted: 2025-05-23

## 2025-09-05 ENCOUNTER — OUTPATIENT (OUTPATIENT)
Dept: OUTPATIENT SERVICES | Facility: HOSPITAL | Age: 65
LOS: 1 days | End: 2025-09-05
Payer: MEDICARE

## 2025-09-05 DIAGNOSIS — Z12.31 ENCOUNTER FOR SCREENING MAMMOGRAM FOR MALIGNANT NEOPLASM OF BREAST: ICD-10-CM

## 2025-09-05 PROCEDURE — 77067 SCR MAMMO BI INCL CAD: CPT

## 2025-09-05 PROCEDURE — 77063 BREAST TOMOSYNTHESIS BI: CPT

## 2025-09-05 PROCEDURE — 77067 SCR MAMMO BI INCL CAD: CPT | Mod: 26

## 2025-09-05 PROCEDURE — 77063 BREAST TOMOSYNTHESIS BI: CPT | Mod: 26

## 2025-09-06 DIAGNOSIS — Z12.31 ENCOUNTER FOR SCREENING MAMMOGRAM FOR MALIGNANT NEOPLASM OF BREAST: ICD-10-CM
